# Patient Record
Sex: MALE | Race: WHITE | Employment: FULL TIME | URBAN - METROPOLITAN AREA
[De-identification: names, ages, dates, MRNs, and addresses within clinical notes are randomized per-mention and may not be internally consistent; named-entity substitution may affect disease eponyms.]

---

## 2018-03-15 ENCOUNTER — HOSPITAL ENCOUNTER (OUTPATIENT)
Dept: INTERVENTIONAL RADIOLOGY/VASCULAR | Age: 53
Discharge: HOME | End: 2018-03-17
Payer: COMMERCIAL

## 2018-03-15 DIAGNOSIS — I73.9 PVD (PERIPHERAL VASCULAR DISEASE) (HCC): ICD-10-CM

## 2018-03-15 PROCEDURE — 93923 UPR/LXTR ART STDY 3+ LVLS: CPT

## 2018-04-25 ENCOUNTER — HOSPITAL ENCOUNTER (OUTPATIENT)
Age: 53
Discharge: HOME | End: 2018-04-27
Payer: COMMERCIAL

## 2018-04-25 ENCOUNTER — HOSPITAL ENCOUNTER (OUTPATIENT)
Dept: GENERAL RADIOLOGY | Age: 53
Discharge: HOME | End: 2018-04-27
Payer: COMMERCIAL

## 2018-04-25 ENCOUNTER — OFFICE VISIT (OUTPATIENT)
Dept: VASCULAR SURGERY | Age: 53
End: 2018-04-25
Payer: COMMERCIAL

## 2018-04-25 VITALS — HEART RATE: 72 BPM | DIASTOLIC BLOOD PRESSURE: 70 MMHG | SYSTOLIC BLOOD PRESSURE: 122 MMHG

## 2018-04-25 DIAGNOSIS — M79.672 FOOT PAIN, BILATERAL: Chronic | ICD-10-CM

## 2018-04-25 DIAGNOSIS — M79.671 FOOT PAIN, BILATERAL: Chronic | ICD-10-CM

## 2018-04-25 DIAGNOSIS — R52 PAIN: ICD-10-CM

## 2018-04-25 PROCEDURE — 1036F TOBACCO NON-USER: CPT | Performed by: SURGERY

## 2018-04-25 PROCEDURE — 3017F COLORECTAL CA SCREEN DOC REV: CPT | Performed by: SURGERY

## 2018-04-25 PROCEDURE — 73630 X-RAY EXAM OF FOOT: CPT

## 2018-04-25 PROCEDURE — G8420 CALC BMI NORM PARAMETERS: HCPCS | Performed by: SURGERY

## 2018-04-25 PROCEDURE — G8427 DOCREV CUR MEDS BY ELIG CLIN: HCPCS | Performed by: SURGERY

## 2018-04-25 PROCEDURE — 99203 OFFICE O/P NEW LOW 30 MIN: CPT | Performed by: SURGERY

## 2018-04-25 RX ORDER — NAPROXEN 500 MG/1
500 TABLET ORAL 2 TIMES DAILY
Refills: 1 | COMMUNITY
Start: 2018-04-19 | End: 2019-04-07

## 2018-05-17 ENCOUNTER — HOSPITAL ENCOUNTER (OUTPATIENT)
Dept: CARDIOLOGY | Age: 53
Discharge: HOME | End: 2018-05-17
Payer: COMMERCIAL

## 2018-05-17 LAB
LV EF: 60 %
LVEF MODALITY: NORMAL

## 2018-05-17 PROCEDURE — 93306 TTE W/DOPPLER COMPLETE: CPT

## 2018-07-02 ENCOUNTER — HOSPITAL ENCOUNTER (OUTPATIENT)
Age: 53
Discharge: HOME | End: 2018-07-04

## 2018-07-02 PROCEDURE — 88305 TISSUE EXAM BY PATHOLOGIST: CPT

## 2018-12-21 ENCOUNTER — HOSPITAL ENCOUNTER (OUTPATIENT)
Dept: ULTRASOUND IMAGING | Age: 53
Discharge: HOME | End: 2018-12-23
Payer: COMMERCIAL

## 2018-12-21 DIAGNOSIS — R10.12 ABDOMINAL PAIN, LEFT UPPER QUADRANT: ICD-10-CM

## 2018-12-21 PROCEDURE — 76700 US EXAM ABDOM COMPLETE: CPT

## 2019-01-24 ENCOUNTER — HOSPITAL ENCOUNTER (OUTPATIENT)
Age: 54
Discharge: HOME | End: 2019-01-26

## 2019-01-24 PROCEDURE — 88342 IMHCHEM/IMCYTCHM 1ST ANTB: CPT

## 2019-01-24 PROCEDURE — 88305 TISSUE EXAM BY PATHOLOGIST: CPT

## 2019-02-12 ENCOUNTER — HOSPITAL ENCOUNTER (OUTPATIENT)
Dept: CT IMAGING | Age: 54
Discharge: HOME | End: 2019-02-14
Payer: COMMERCIAL

## 2019-02-12 DIAGNOSIS — R07.9 CHEST PAIN, UNSPECIFIED TYPE: ICD-10-CM

## 2019-02-12 DIAGNOSIS — S21.342S: ICD-10-CM

## 2019-02-12 DIAGNOSIS — S27.809A RUPTURE OF DIAPHRAGM: ICD-10-CM

## 2019-02-12 PROCEDURE — 71250 CT THORAX DX C-: CPT

## 2019-04-07 ENCOUNTER — HOSPITAL ENCOUNTER (EMERGENCY)
Age: 54
Discharge: HOME | End: 2019-04-07
Attending: EMERGENCY MEDICINE
Payer: COMMERCIAL

## 2019-04-07 VITALS
HEART RATE: 65 BPM | OXYGEN SATURATION: 98 % | TEMPERATURE: 98 F | HEIGHT: 70 IN | WEIGHT: 165 LBS | DIASTOLIC BLOOD PRESSURE: 82 MMHG | BODY MASS INDEX: 23.62 KG/M2 | RESPIRATION RATE: 16 BRPM | SYSTOLIC BLOOD PRESSURE: 130 MMHG

## 2019-04-07 DIAGNOSIS — L02.91 ABSCESS: Primary | ICD-10-CM

## 2019-04-07 PROCEDURE — 99282 EMERGENCY DEPT VISIT SF MDM: CPT

## 2019-04-07 PROCEDURE — 10061 I&D ABSCESS COMP/MULTIPLE: CPT

## 2019-04-07 ASSESSMENT — PAIN DESCRIPTION - DESCRIPTORS: DESCRIPTORS: DISCOMFORT

## 2019-04-07 ASSESSMENT — PAIN DESCRIPTION - PAIN TYPE: TYPE: ACUTE PAIN

## 2019-04-07 ASSESSMENT — PAIN DESCRIPTION - LOCATION: LOCATION: NECK

## 2019-04-07 ASSESSMENT — PAIN SCALES - GENERAL: PAINLEVEL_OUTOF10: 2
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
## 2019-04-09 ENCOUNTER — HOSPITAL ENCOUNTER (EMERGENCY)
Age: 54
Discharge: HOME | End: 2019-04-09
Attending: EMERGENCY MEDICINE
Payer: COMMERCIAL

## 2019-04-09 VITALS
OXYGEN SATURATION: 95 % | SYSTOLIC BLOOD PRESSURE: 130 MMHG | HEIGHT: 70 IN | WEIGHT: 168 LBS | TEMPERATURE: 97.6 F | DIASTOLIC BLOOD PRESSURE: 86 MMHG | HEART RATE: 55 BPM | RESPIRATION RATE: 16 BRPM | BODY MASS INDEX: 24.05 KG/M2

## 2019-04-09 DIAGNOSIS — L02.91 ABSCESS: Primary | ICD-10-CM

## 2019-04-09 PROCEDURE — 6370000000 HC RX 637 (ALT 250 FOR IP): Performed by: EMERGENCY MEDICINE

## 2019-04-09 PROCEDURE — 99282 EMERGENCY DEPT VISIT SF MDM: CPT

## 2019-04-09 RX ORDER — CEPHALEXIN 500 MG/1
500 CAPSULE ORAL 4 TIMES DAILY
Qty: 40 CAPSULE | Refills: 0 | Status: SHIPPED | OUTPATIENT
Start: 2019-04-09 | End: 2019-04-19

## 2019-04-09 RX ORDER — CEPHALEXIN 500 MG/1
500 CAPSULE ORAL ONCE
Status: COMPLETED | OUTPATIENT
Start: 2019-04-09 | End: 2019-04-09

## 2019-04-09 RX ORDER — SULFAMETHOXAZOLE AND TRIMETHOPRIM 800; 160 MG/1; MG/1
2 TABLET ORAL 2 TIMES DAILY
Qty: 40 TABLET | Refills: 0 | Status: SHIPPED | OUTPATIENT
Start: 2019-04-09 | End: 2019-04-19

## 2019-04-09 RX ORDER — SULFAMETHOXAZOLE AND TRIMETHOPRIM 800; 160 MG/1; MG/1
2 TABLET ORAL ONCE
Status: COMPLETED | OUTPATIENT
Start: 2019-04-09 | End: 2019-04-09

## 2019-04-09 RX ADMIN — CEPHALEXIN 500 MG: 500 CAPSULE ORAL at 17:58

## 2019-04-09 RX ADMIN — SULFAMETHOXAZOLE AND TRIMETHOPRIM 2 TABLET: 800; 160 TABLET ORAL at 17:56

## 2019-04-09 ASSESSMENT — PAIN DESCRIPTION - ORIENTATION: ORIENTATION: RIGHT

## 2019-04-09 ASSESSMENT — PAIN DESCRIPTION - LOCATION: LOCATION: NECK

## 2019-04-09 ASSESSMENT — PAIN DESCRIPTION - DESCRIPTORS: DESCRIPTORS: SORE

## 2019-04-09 ASSESSMENT — PAIN DESCRIPTION - FREQUENCY: FREQUENCY: CONTINUOUS

## 2019-04-09 ASSESSMENT — PAIN DESCRIPTION - PAIN TYPE: TYPE: ACUTE PAIN

## 2019-04-09 ASSESSMENT — PAIN SCALES - GENERAL: PAINLEVEL_OUTOF10: 10
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
## 2019-11-25 ENCOUNTER — TELEPHONE (OUTPATIENT)
Dept: CARDIOLOGY | Age: 54
End: 2019-11-25

## 2019-11-27 ENCOUNTER — HOSPITAL ENCOUNTER (OUTPATIENT)
Dept: CARDIOLOGY | Age: 54
Discharge: HOME | End: 2019-11-27
Payer: COMMERCIAL

## 2019-11-27 VITALS
WEIGHT: 168 LBS | DIASTOLIC BLOOD PRESSURE: 70 MMHG | HEART RATE: 82 BPM | BODY MASS INDEX: 24.88 KG/M2 | SYSTOLIC BLOOD PRESSURE: 130 MMHG | HEIGHT: 69 IN

## 2019-11-27 DIAGNOSIS — R07.9 CHEST PAIN, UNSPECIFIED TYPE: Primary | ICD-10-CM

## 2019-11-27 LAB
LV EF: 50 %
LVEF MODALITY: NORMAL

## 2019-11-27 PROCEDURE — 2580000003 HC RX 258: Performed by: INTERNAL MEDICINE

## 2019-11-27 PROCEDURE — 78452 HT MUSCLE IMAGE SPECT MULT: CPT

## 2019-11-27 PROCEDURE — A9500 TC99M SESTAMIBI: HCPCS | Performed by: INTERNAL MEDICINE

## 2019-11-27 PROCEDURE — 3430000000 HC RX DIAGNOSTIC RADIOPHARMACEUTICAL: Performed by: INTERNAL MEDICINE

## 2019-11-27 PROCEDURE — 93017 CV STRESS TEST TRACING ONLY: CPT

## 2019-11-27 RX ORDER — SODIUM CHLORIDE 0.9 % (FLUSH) 0.9 %
10 SYRINGE (ML) INJECTION PRN
Status: DISCONTINUED | OUTPATIENT
Start: 2019-11-27 | End: 2019-11-28 | Stop reason: HOSPADM

## 2019-11-27 RX ORDER — CYCLOBENZAPRINE HCL 10 MG
TABLET ORAL PRN
Refills: 5 | COMMUNITY
Start: 2019-11-19 | End: 2020-02-27 | Stop reason: CLARIF

## 2019-11-27 RX ADMIN — Medication 10 ML: at 09:30

## 2019-11-27 RX ADMIN — Medication 10 ML: at 07:31

## 2019-11-27 RX ADMIN — Medication 10.2 MILLICURIE: at 07:30

## 2019-11-27 RX ADMIN — Medication 32.9 MILLICURIE: at 09:30

## 2019-12-06 ENCOUNTER — OFFICE VISIT (OUTPATIENT)
Dept: CARDIOLOGY CLINIC | Age: 54
End: 2019-12-06
Payer: COMMERCIAL

## 2019-12-06 VITALS
WEIGHT: 168 LBS | BODY MASS INDEX: 24.05 KG/M2 | HEART RATE: 58 BPM | RESPIRATION RATE: 16 BRPM | SYSTOLIC BLOOD PRESSURE: 124 MMHG | HEIGHT: 70 IN | DIASTOLIC BLOOD PRESSURE: 82 MMHG

## 2019-12-06 DIAGNOSIS — R07.2 PRECORDIAL PAIN: ICD-10-CM

## 2019-12-06 PROCEDURE — 99244 OFF/OP CNSLTJ NEW/EST MOD 40: CPT | Performed by: INTERNAL MEDICINE

## 2019-12-06 PROCEDURE — 93000 ELECTROCARDIOGRAM COMPLETE: CPT | Performed by: INTERNAL MEDICINE

## 2019-12-06 PROCEDURE — G8427 DOCREV CUR MEDS BY ELIG CLIN: HCPCS | Performed by: INTERNAL MEDICINE

## 2019-12-06 PROCEDURE — G8484 FLU IMMUNIZE NO ADMIN: HCPCS | Performed by: INTERNAL MEDICINE

## 2019-12-06 PROCEDURE — G8420 CALC BMI NORM PARAMETERS: HCPCS | Performed by: INTERNAL MEDICINE

## 2020-02-27 ENCOUNTER — OFFICE VISIT (OUTPATIENT)
Dept: CARDIOLOGY CLINIC | Age: 55
End: 2020-02-27
Payer: COMMERCIAL

## 2020-02-27 VITALS
BODY MASS INDEX: 24.34 KG/M2 | RESPIRATION RATE: 16 BRPM | WEIGHT: 170 LBS | SYSTOLIC BLOOD PRESSURE: 132 MMHG | DIASTOLIC BLOOD PRESSURE: 84 MMHG | HEIGHT: 70 IN | HEART RATE: 50 BPM

## 2020-02-27 PROCEDURE — 93000 ELECTROCARDIOGRAM COMPLETE: CPT | Performed by: INTERNAL MEDICINE

## 2020-02-27 PROCEDURE — G8420 CALC BMI NORM PARAMETERS: HCPCS | Performed by: INTERNAL MEDICINE

## 2020-02-27 PROCEDURE — G8484 FLU IMMUNIZE NO ADMIN: HCPCS | Performed by: INTERNAL MEDICINE

## 2020-02-27 PROCEDURE — 99215 OFFICE O/P EST HI 40 MIN: CPT | Performed by: INTERNAL MEDICINE

## 2020-02-27 PROCEDURE — 3017F COLORECTAL CA SCREEN DOC REV: CPT | Performed by: INTERNAL MEDICINE

## 2020-02-27 PROCEDURE — 1036F TOBACCO NON-USER: CPT | Performed by: INTERNAL MEDICINE

## 2020-02-27 PROCEDURE — G8427 DOCREV CUR MEDS BY ELIG CLIN: HCPCS | Performed by: INTERNAL MEDICINE

## 2020-02-27 RX ORDER — IBUPROFEN 800 MG/1
800 TABLET ORAL EVERY 6 HOURS PRN
COMMUNITY
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
## 2020-03-04 RX ORDER — NITROGLYCERIN 0.4 MG/1
0.4 TABLET SUBLINGUAL ONCE
Status: CANCELLED | OUTPATIENT
Start: 2020-03-10

## 2020-03-04 RX ORDER — SODIUM CHLORIDE 0.9 % (FLUSH) 0.9 %
10 SYRINGE (ML) INJECTION PRN
Status: CANCELLED | OUTPATIENT
Start: 2020-03-04

## 2020-03-04 RX ORDER — SODIUM CHLORIDE 9 MG/ML
INJECTION, SOLUTION INTRAVENOUS ONCE
Status: CANCELLED | OUTPATIENT
Start: 2020-03-10

## 2020-05-29 ENCOUNTER — TELEPHONE (OUTPATIENT)
Dept: CARDIOLOGY CLINIC | Age: 55
End: 2020-05-29

## 2020-07-27 ENCOUNTER — HOSPITAL ENCOUNTER (OUTPATIENT)
Age: 55
Discharge: HOME | End: 2020-07-29
Payer: COMMERCIAL

## 2020-07-27 ENCOUNTER — HOSPITAL ENCOUNTER (OUTPATIENT)
Dept: GENERAL RADIOLOGY | Age: 55
Discharge: HOME | End: 2020-07-29
Payer: COMMERCIAL

## 2020-07-27 PROCEDURE — 74018 RADEX ABDOMEN 1 VIEW: CPT

## 2020-08-31 ENCOUNTER — TELEPHONE (OUTPATIENT)
Dept: CT IMAGING | Age: 55
End: 2020-08-31

## 2020-08-31 NOTE — TELEPHONE ENCOUNTER
8/31/20 patient and office notified that due to missed appts on 4/13/2020 was r/s due to cv 19 , did not keep on 6/1/20, 6/29/20 and 8/31/2020. Patient will not be r/s at this time. L/m on patient line informing him to call cardiology office for follow up.   Office notified

## 2022-09-29 ENCOUNTER — HOSPITAL ENCOUNTER (EMERGENCY)
Age: 57
Discharge: HOME | End: 2022-09-29
Attending: EMERGENCY MEDICINE
Payer: COMMERCIAL

## 2022-09-29 ENCOUNTER — APPOINTMENT (OUTPATIENT)
Dept: CT IMAGING | Age: 57
End: 2022-09-29
Payer: COMMERCIAL

## 2022-09-29 VITALS
WEIGHT: 165 LBS | HEART RATE: 57 BPM | DIASTOLIC BLOOD PRESSURE: 85 MMHG | RESPIRATION RATE: 14 BRPM | HEIGHT: 70 IN | BODY MASS INDEX: 23.62 KG/M2 | TEMPERATURE: 98 F | SYSTOLIC BLOOD PRESSURE: 138 MMHG | OXYGEN SATURATION: 99 %

## 2022-09-29 DIAGNOSIS — G89.29 CHRONIC NONINTRACTABLE HEADACHE, UNSPECIFIED HEADACHE TYPE: Primary | ICD-10-CM

## 2022-09-29 DIAGNOSIS — R51.9 CHRONIC NONINTRACTABLE HEADACHE, UNSPECIFIED HEADACHE TYPE: Primary | ICD-10-CM

## 2022-09-29 LAB
ANION GAP SERPL CALCULATED.3IONS-SCNC: 9 MMOL/L (ref 7–16)
BASOPHILS ABSOLUTE: 0.05 E9/L (ref 0–0.2)
BASOPHILS RELATIVE PERCENT: 0.8 % (ref 0–2)
BUN BLDV-MCNC: 14 MG/DL (ref 6–20)
CALCIUM SERPL-MCNC: 9 MG/DL (ref 8.6–10.2)
CHLORIDE BLD-SCNC: 107 MMOL/L (ref 98–107)
CO2: 23 MMOL/L (ref 22–29)
CREAT SERPL-MCNC: 0.7 MG/DL (ref 0.7–1.2)
EOSINOPHILS ABSOLUTE: 0.17 E9/L (ref 0.05–0.5)
EOSINOPHILS RELATIVE PERCENT: 2.8 % (ref 0–6)
GFR AFRICAN AMERICAN: >60
GFR NON-AFRICAN AMERICAN: >60 ML/MIN/1.73
GLUCOSE BLD-MCNC: 129 MG/DL (ref 74–99)
HCT VFR BLD CALC: 39 % (ref 37–54)
HEMOGLOBIN: 13.3 G/DL (ref 12.5–16.5)
IMMATURE GRANULOCYTES #: 0.01 E9/L
IMMATURE GRANULOCYTES %: 0.2 % (ref 0–5)
LYMPHOCYTES ABSOLUTE: 1.99 E9/L (ref 1.5–4)
LYMPHOCYTES RELATIVE PERCENT: 32.5 % (ref 20–42)
MCH RBC QN AUTO: 30.1 PG (ref 26–35)
MCHC RBC AUTO-ENTMCNC: 34.1 % (ref 32–34.5)
MCV RBC AUTO: 88.2 FL (ref 80–99.9)
MONOCYTES ABSOLUTE: 0.44 E9/L (ref 0.1–0.95)
MONOCYTES RELATIVE PERCENT: 7.2 % (ref 2–12)
NEUTROPHILS ABSOLUTE: 3.46 E9/L (ref 1.8–7.3)
NEUTROPHILS RELATIVE PERCENT: 56.5 % (ref 43–80)
PDW BLD-RTO: 13.2 FL (ref 11.5–15)
PLATELET # BLD: 227 E9/L (ref 130–450)
PMV BLD AUTO: 10.3 FL (ref 7–12)
POTASSIUM SERPL-SCNC: 4 MMOL/L (ref 3.5–5)
RBC # BLD: 4.42 E12/L (ref 3.8–5.8)
SODIUM BLD-SCNC: 139 MMOL/L (ref 132–146)
WBC # BLD: 6.1 E9/L (ref 4.5–11.5)

## 2022-09-29 PROCEDURE — 70450 CT HEAD/BRAIN W/O DYE: CPT

## 2022-09-29 PROCEDURE — 6360000002 HC RX W HCPCS: Performed by: EMERGENCY MEDICINE

## 2022-09-29 PROCEDURE — 99284 EMERGENCY DEPT VISIT MOD MDM: CPT

## 2022-09-29 PROCEDURE — 96374 THER/PROPH/DIAG INJ IV PUSH: CPT

## 2022-09-29 PROCEDURE — 80048 BASIC METABOLIC PNL TOTAL CA: CPT

## 2022-09-29 PROCEDURE — 6370000000 HC RX 637 (ALT 250 FOR IP): Performed by: EMERGENCY MEDICINE

## 2022-09-29 PROCEDURE — 85025 COMPLETE CBC W/AUTO DIFF WBC: CPT

## 2022-09-29 PROCEDURE — 2580000003 HC RX 258: Performed by: EMERGENCY MEDICINE

## 2022-09-29 PROCEDURE — 96375 TX/PRO/DX INJ NEW DRUG ADDON: CPT

## 2022-09-29 RX ORDER — METOCLOPRAMIDE HYDROCHLORIDE 5 MG/ML
10 INJECTION INTRAMUSCULAR; INTRAVENOUS ONCE
Status: COMPLETED | OUTPATIENT
Start: 2022-09-29 | End: 2022-09-29

## 2022-09-29 RX ORDER — 0.9 % SODIUM CHLORIDE 0.9 %
1000 INTRAVENOUS SOLUTION INTRAVENOUS ONCE
Status: COMPLETED | OUTPATIENT
Start: 2022-09-29 | End: 2022-09-29

## 2022-09-29 RX ORDER — DIPHENHYDRAMINE HYDROCHLORIDE 50 MG/ML
25 INJECTION INTRAMUSCULAR; INTRAVENOUS ONCE
Status: COMPLETED | OUTPATIENT
Start: 2022-09-29 | End: 2022-09-29

## 2022-09-29 RX ORDER — ACETAMINOPHEN 500 MG
1000 TABLET ORAL ONCE
Status: COMPLETED | OUTPATIENT
Start: 2022-09-29 | End: 2022-09-29

## 2022-09-29 RX ADMIN — ACETAMINOPHEN 1000 MG: 500 TABLET ORAL at 12:06

## 2022-09-29 RX ADMIN — METOCLOPRAMIDE 10 MG: 5 INJECTION, SOLUTION INTRAMUSCULAR; INTRAVENOUS at 12:08

## 2022-09-29 RX ADMIN — DIPHENHYDRAMINE HYDROCHLORIDE 25 MG: 50 INJECTION, SOLUTION INTRAMUSCULAR; INTRAVENOUS at 12:08

## 2022-09-29 RX ADMIN — SODIUM CHLORIDE 1000 ML: 9 INJECTION, SOLUTION INTRAVENOUS at 12:07

## 2022-09-29 ASSESSMENT — PAIN SCALES - GENERAL: PAINLEVEL_OUTOF10: 3

## 2022-09-29 NOTE — ED PROVIDER NOTES
biopsy. Social History:  reports that he has quit smoking. His smoking use included cigarettes. He has a 38.00 pack-year smoking history. He quit smokeless tobacco use about 4 years ago. He reports current alcohol use. He reports current drug use. Drug: Marijuana Aloma Bear). Family History: family history includes Diabetes in his mother; Obesity in his brother, brother, and sister; Other in his brother and brother. The patients home medications have been reviewed. Allergies: Other    ---------------------------------------------------PHYSICAL EXAM--------------------------------------    Constitutional/General: Alert and oriented x3, well appearing, non toxic in NAD; patient smiling pleasant   Head: Normocephalic and atraumatic  Eyes: PERRL, EOMI, conjunctive normal, sclera non icteric  Mouth: Oropharynx clear, handling secretions, no trismus, no asymmetry of the posterior oropharynx or uvular edema  Neck: Supple, full ROM, non tender to palpation in the midline, no stridor, no crepitus, no meningeal signs  Respiratory: Lungs clear to auscultation bilaterally, no wheezes, rales, or rhonchi. Not in respiratory distress  Cardiovascular:  Regular rate. Regular rhythm. No murmurs, gallops, or rubs. 2+ distal pulses  Chest: No chest wall tenderness  GI:  Abdomen Soft, Non tender, Non distended. +BS. No organomegaly, no palpable masses,  No rebound, guarding, or rigidity. Musculoskeletal: Moves all extremities x 4. Warm and well perfused, no clubbing, cyanosis, or edema. Capillary refill <3 seconds  Integument: skin warm and dry. No rashes. Lymphatic: no lymphadenopathy noted  Neurologic: GCS 15, no focal deficits, symmetric strength 5/5 in the upper and lower extremities bilaterally; no aphasia. No dysarthria. No facial droop.   Psychiatric: Normal Affect    -------------------------------------------------- RESULTS -------------------------------------------------  I have personally reviewed all laboratory and imaging results for this patient. Results are listed below. LABS:  Results for orders placed or performed during the hospital encounter of 09/29/22   CBC with Auto Differential   Result Value Ref Range    WBC 6.1 4.5 - 11.5 E9/L    RBC 4.42 3.80 - 5.80 E12/L    Hemoglobin 13.3 12.5 - 16.5 g/dL    Hematocrit 39.0 37.0 - 54.0 %    MCV 88.2 80.0 - 99.9 fL    MCH 30.1 26.0 - 35.0 pg    MCHC 34.1 32.0 - 34.5 %    RDW 13.2 11.5 - 15.0 fL    Platelets 886 379 - 884 E9/L    MPV 10.3 7.0 - 12.0 fL    Neutrophils % 56.5 43.0 - 80.0 %    Immature Granulocytes % 0.2 0.0 - 5.0 %    Lymphocytes % 32.5 20.0 - 42.0 %    Monocytes % 7.2 2.0 - 12.0 %    Eosinophils % 2.8 0.0 - 6.0 %    Basophils % 0.8 0.0 - 2.0 %    Neutrophils Absolute 3.46 1.80 - 7.30 E9/L    Immature Granulocytes # 0.01 E9/L    Lymphocytes Absolute 1.99 1.50 - 4.00 E9/L    Monocytes Absolute 0.44 0.10 - 0.95 E9/L    Eosinophils Absolute 0.17 0.05 - 0.50 E9/L    Basophils Absolute 0.05 0.00 - 0.20 U6/C   Basic Metabolic Panel   Result Value Ref Range    Sodium 139 132 - 146 mmol/L    Potassium 4.0 3.5 - 5.0 mmol/L    Chloride 107 98 - 107 mmol/L    CO2 23 22 - 29 mmol/L    Anion Gap 9 7 - 16 mmol/L    Glucose 129 (H) 74 - 99 mg/dL    BUN 14 6 - 20 mg/dL    Creatinine 0.7 0.7 - 1.2 mg/dL    GFR Non-African American >60 >=60 mL/min/1.73    GFR African American >60     Calcium 9.0 8.6 - 10.2 mg/dL       RADIOLOGY:  Interpreted by Radiologist.  CT HEAD WO CONTRAST   Final Result   No acute intracranial abnormality. Specifically, there is no acute   intracranial hemorrhage                   ------------------------- NURSING NOTES AND VITALS REVIEWED ---------------------------   The nursing notes within the ED encounter and vital signs as below have been reviewed by myself.   /85   Pulse 57   Temp 98 °F (36.7 °C) (Temporal)   Resp 14   Ht 5' 10\" (1.778 m)   Wt 165 lb (74.8 kg)   SpO2 99%   BMI 23.68 kg/m²   Oxygen Saturation discharge. This patient has remained hemodynamically stable during their ED course. Re-Evaluations:             Re-evaluation. Patients symptoms are improving        Counseling: The emergency provider has spoken with the patient and discussed todays results, in addition to providing specific details for the plan of care and counseling regarding the diagnosis and prognosis. Questions are answered at this time and they are agreeable with the plan.       --------------------------------- IMPRESSION AND DISPOSITION ---------------------------------    IMPRESSION  1. Chronic nonintractable headache, unspecified headache type Improving       DISPOSITION  Disposition: Discharge to home  Patient condition is stable    NOTE: This report was transcribed using voice recognition software.  Every effort was made to ensure accuracy; however, inadvertent computerized transcription errors may be present        Michael Bass MD  09/29/22 0054

## 2022-10-04 ENCOUNTER — OFFICE VISIT (OUTPATIENT)
Dept: NEUROLOGY | Age: 57
End: 2022-10-04
Payer: COMMERCIAL

## 2022-10-04 VITALS
DIASTOLIC BLOOD PRESSURE: 83 MMHG | BODY MASS INDEX: 23.68 KG/M2 | OXYGEN SATURATION: 97 % | SYSTOLIC BLOOD PRESSURE: 135 MMHG | HEART RATE: 73 BPM | TEMPERATURE: 97.8 F | WEIGHT: 165 LBS

## 2022-10-04 DIAGNOSIS — M54.81 CERVICO-OCCIPITAL NEURALGIA OF LEFT SIDE: ICD-10-CM

## 2022-10-04 DIAGNOSIS — R51.9 NEW ONSET OF HEADACHES AFTER AGE 50: Primary | ICD-10-CM

## 2022-10-04 DIAGNOSIS — R51.9 OCCIPITAL HEADACHE: ICD-10-CM

## 2022-10-04 DIAGNOSIS — R51.9 NEW ONSET OF HEADACHES AFTER AGE 50: ICD-10-CM

## 2022-10-04 PROBLEM — R07.2 PRECORDIAL PAIN: Status: RESOLVED | Noted: 2019-12-06 | Resolved: 2022-10-04

## 2022-10-04 LAB
BUN BLDV-MCNC: 13 MG/DL (ref 6–20)
C-REACTIVE PROTEIN: 0.3 MG/DL (ref 0–0.4)
CREAT SERPL-MCNC: 0.9 MG/DL (ref 0.7–1.2)
GFR AFRICAN AMERICAN: >60
GFR NON-AFRICAN AMERICAN: >60 ML/MIN/1.73
TSH SERPL DL<=0.05 MIU/L-ACNC: 0.9 UIU/ML (ref 0.27–4.2)

## 2022-10-04 PROCEDURE — G8420 CALC BMI NORM PARAMETERS: HCPCS | Performed by: NURSE PRACTITIONER

## 2022-10-04 PROCEDURE — 99204 OFFICE O/P NEW MOD 45 MIN: CPT | Performed by: NURSE PRACTITIONER

## 2022-10-04 PROCEDURE — 3017F COLORECTAL CA SCREEN DOC REV: CPT | Performed by: NURSE PRACTITIONER

## 2022-10-04 PROCEDURE — G8427 DOCREV CUR MEDS BY ELIG CLIN: HCPCS | Performed by: NURSE PRACTITIONER

## 2022-10-04 PROCEDURE — 4004F PT TOBACCO SCREEN RCVD TLK: CPT | Performed by: NURSE PRACTITIONER

## 2022-10-04 PROCEDURE — G8484 FLU IMMUNIZE NO ADMIN: HCPCS | Performed by: NURSE PRACTITIONER

## 2022-10-04 RX ORDER — ESOMEPRAZOLE MAGNESIUM 40 MG/1
CAPSULE, DELAYED RELEASE ORAL
COMMUNITY
Start: 2022-09-30

## 2022-10-04 RX ORDER — AMITRIPTYLINE HYDROCHLORIDE 10 MG/1
10 TABLET, FILM COATED ORAL NIGHTLY
Qty: 90 TABLET | Refills: 3 | Status: SHIPPED | OUTPATIENT
Start: 2022-10-04

## 2022-10-04 RX ORDER — LORAZEPAM 0.5 MG/1
0.5 TABLET ORAL ONCE
Qty: 1 TABLET | Refills: 0 | Status: SHIPPED | OUTPATIENT
Start: 2022-10-04 | End: 2022-10-04
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
## 2022-10-05 LAB — SEDIMENTATION RATE, ERYTHROCYTE: 2 MM/HR (ref 0–15)

## 2022-12-06 ENCOUNTER — HOSPITAL ENCOUNTER (EMERGENCY)
Age: 57
Discharge: HOME | DRG: 603 | End: 2022-12-06
Attending: STUDENT IN AN ORGANIZED HEALTH CARE EDUCATION/TRAINING PROGRAM
Payer: COMMERCIAL

## 2022-12-06 ENCOUNTER — APPOINTMENT (OUTPATIENT)
Dept: GENERAL RADIOLOGY | Age: 57
DRG: 603 | End: 2022-12-06
Payer: COMMERCIAL

## 2022-12-06 VITALS
HEART RATE: 70 BPM | WEIGHT: 165 LBS | HEIGHT: 70 IN | DIASTOLIC BLOOD PRESSURE: 72 MMHG | OXYGEN SATURATION: 99 % | SYSTOLIC BLOOD PRESSURE: 134 MMHG | TEMPERATURE: 97.8 F | BODY MASS INDEX: 23.62 KG/M2 | RESPIRATION RATE: 14 BRPM

## 2022-12-06 DIAGNOSIS — M79.642 LEFT HAND PAIN: ICD-10-CM

## 2022-12-06 DIAGNOSIS — L03.90 CELLULITIS, UNSPECIFIED CELLULITIS SITE: Primary | ICD-10-CM

## 2022-12-06 PROCEDURE — 6370000000 HC RX 637 (ALT 250 FOR IP): Performed by: STUDENT IN AN ORGANIZED HEALTH CARE EDUCATION/TRAINING PROGRAM

## 2022-12-06 PROCEDURE — 90471 IMMUNIZATION ADMIN: CPT | Performed by: STUDENT IN AN ORGANIZED HEALTH CARE EDUCATION/TRAINING PROGRAM

## 2022-12-06 PROCEDURE — 96372 THER/PROPH/DIAG INJ SC/IM: CPT

## 2022-12-06 PROCEDURE — 90714 TD VACC NO PRESV 7 YRS+ IM: CPT | Performed by: STUDENT IN AN ORGANIZED HEALTH CARE EDUCATION/TRAINING PROGRAM

## 2022-12-06 PROCEDURE — 6360000002 HC RX W HCPCS: Performed by: STUDENT IN AN ORGANIZED HEALTH CARE EDUCATION/TRAINING PROGRAM

## 2022-12-06 PROCEDURE — 99284 EMERGENCY DEPT VISIT MOD MDM: CPT

## 2022-12-06 PROCEDURE — 73130 X-RAY EXAM OF HAND: CPT

## 2022-12-06 RX ORDER — CEPHALEXIN 500 MG/1
500 CAPSULE ORAL 4 TIMES DAILY
Qty: 28 CAPSULE | Refills: 0 | Status: ON HOLD | OUTPATIENT
Start: 2022-12-06 | End: 2022-12-14 | Stop reason: HOSPADM

## 2022-12-06 RX ORDER — TETANUS AND DIPHTHERIA TOXOIDS ADSORBED 2; 2 [LF]/.5ML; [LF]/.5ML
0.5 INJECTION INTRAMUSCULAR ONCE
Status: COMPLETED | OUTPATIENT
Start: 2022-12-06 | End: 2022-12-06

## 2022-12-06 RX ORDER — CEPHALEXIN 500 MG/1
500 CAPSULE ORAL ONCE
Status: COMPLETED | OUTPATIENT
Start: 2022-12-06 | End: 2022-12-06

## 2022-12-06 RX ORDER — SULFAMETHOXAZOLE AND TRIMETHOPRIM 800; 160 MG/1; MG/1
1 TABLET ORAL ONCE
Status: COMPLETED | OUTPATIENT
Start: 2022-12-06 | End: 2022-12-06

## 2022-12-06 RX ORDER — SULFAMETHOXAZOLE AND TRIMETHOPRIM 800; 160 MG/1; MG/1
2 TABLET ORAL 2 TIMES DAILY
Qty: 28 TABLET | Refills: 0 | Status: ON HOLD | OUTPATIENT
Start: 2022-12-06 | End: 2022-12-14 | Stop reason: HOSPADM

## 2022-12-06 RX ADMIN — SULFAMETHOXAZOLE AND TRIMETHOPRIM 1 TABLET: 800; 160 TABLET ORAL at 08:04

## 2022-12-06 RX ADMIN — CEPHALEXIN 500 MG: 500 CAPSULE ORAL at 08:04

## 2022-12-06 RX ADMIN — TETANUS AND DIPHTHERIA TOXOIDS ADSORBED 0.5 ML: 2; 2 INJECTION INTRAMUSCULAR at 08:04

## 2022-12-06 ASSESSMENT — ENCOUNTER SYMPTOMS
COUGH: 0
CHEST TIGHTNESS: 0
VOMITING: 0
BACK PAIN: 0
DIARRHEA: 0
NAUSEA: 0
PHOTOPHOBIA: 0
ABDOMINAL PAIN: 0
SHORTNESS OF BREATH: 0
ABDOMINAL DISTENTION: 0
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
## 2022-12-08 ENCOUNTER — HOSPITAL ENCOUNTER (INPATIENT)
Age: 57
LOS: 6 days | Discharge: HOME | DRG: 603 | End: 2022-12-14
Attending: STUDENT IN AN ORGANIZED HEALTH CARE EDUCATION/TRAINING PROGRAM | Admitting: INTERNAL MEDICINE
Payer: COMMERCIAL

## 2022-12-08 DIAGNOSIS — L03.90 CELLULITIS, UNSPECIFIED CELLULITIS SITE: Primary | ICD-10-CM

## 2022-12-08 LAB
ALBUMIN SERPL-MCNC: 4.7 G/DL (ref 3.5–5.2)
ALP BLD-CCNC: 115 U/L (ref 40–129)
ALT SERPL-CCNC: 27 U/L (ref 0–40)
ANION GAP SERPL CALCULATED.3IONS-SCNC: 13 MMOL/L (ref 7–16)
AST SERPL-CCNC: 20 U/L (ref 0–39)
BASOPHILS ABSOLUTE: 0.03 E9/L (ref 0–0.2)
BASOPHILS RELATIVE PERCENT: 0.2 % (ref 0–2)
BILIRUB SERPL-MCNC: 1.3 MG/DL (ref 0–1.2)
BUN BLDV-MCNC: 15 MG/DL (ref 6–20)
C-REACTIVE PROTEIN: 3.1 MG/DL (ref 0–0.4)
CALCIUM SERPL-MCNC: 9.8 MG/DL (ref 8.6–10.2)
CHLORIDE BLD-SCNC: 100 MMOL/L (ref 98–107)
CO2: 20 MMOL/L (ref 22–29)
CREAT SERPL-MCNC: 1.1 MG/DL (ref 0.7–1.2)
EOSINOPHILS ABSOLUTE: 0.02 E9/L (ref 0.05–0.5)
EOSINOPHILS RELATIVE PERCENT: 0.2 % (ref 0–6)
GFR SERPL CREATININE-BSD FRML MDRD: >60 ML/MIN/1.73
GLUCOSE BLD-MCNC: 110 MG/DL (ref 74–99)
HCT VFR BLD CALC: 45.3 % (ref 37–54)
HEMOGLOBIN: 15.6 G/DL (ref 12.5–16.5)
IMMATURE GRANULOCYTES #: 0.05 E9/L
IMMATURE GRANULOCYTES %: 0.4 % (ref 0–5)
LACTIC ACID, SEPSIS: 1.1 MMOL/L (ref 0.5–1.9)
LACTIC ACID: 1.1 MMOL/L (ref 0.5–2.2)
LYMPHOCYTES ABSOLUTE: 1.6 E9/L (ref 1.5–4)
LYMPHOCYTES RELATIVE PERCENT: 12.2 % (ref 20–42)
MCH RBC QN AUTO: 30.6 PG (ref 26–35)
MCHC RBC AUTO-ENTMCNC: 34.4 % (ref 32–34.5)
MCV RBC AUTO: 88.8 FL (ref 80–99.9)
MONOCYTES ABSOLUTE: 0.94 E9/L (ref 0.1–0.95)
MONOCYTES RELATIVE PERCENT: 7.1 % (ref 2–12)
NEUTROPHILS ABSOLUTE: 10.51 E9/L (ref 1.8–7.3)
NEUTROPHILS RELATIVE PERCENT: 79.9 % (ref 43–80)
PDW BLD-RTO: 13 FL (ref 11.5–15)
PLATELET # BLD: 269 E9/L (ref 130–450)
PMV BLD AUTO: 10.6 FL (ref 7–12)
POTASSIUM SERPL-SCNC: 4.4 MMOL/L (ref 3.5–5)
RBC # BLD: 5.1 E12/L (ref 3.8–5.8)
SEDIMENTATION RATE, ERYTHROCYTE: 12 MM/HR (ref 0–15)
SODIUM BLD-SCNC: 133 MMOL/L (ref 132–146)
TOTAL PROTEIN: 8.2 G/DL (ref 6.4–8.3)
WBC # BLD: 13.2 E9/L (ref 4.5–11.5)

## 2022-12-08 PROCEDURE — 1200000000 HC SEMI PRIVATE

## 2022-12-08 PROCEDURE — 6370000000 HC RX 637 (ALT 250 FOR IP): Performed by: NURSE PRACTITIONER

## 2022-12-08 PROCEDURE — 2580000003 HC RX 258: Performed by: NURSE PRACTITIONER

## 2022-12-08 PROCEDURE — 36415 COLL VENOUS BLD VENIPUNCTURE: CPT

## 2022-12-08 PROCEDURE — 6360000002 HC RX W HCPCS: Performed by: NURSE PRACTITIONER

## 2022-12-08 PROCEDURE — 85025 COMPLETE CBC W/AUTO DIFF WBC: CPT

## 2022-12-08 PROCEDURE — 83605 ASSAY OF LACTIC ACID: CPT

## 2022-12-08 PROCEDURE — 80053 COMPREHEN METABOLIC PANEL: CPT

## 2022-12-08 PROCEDURE — 85651 RBC SED RATE NONAUTOMATED: CPT

## 2022-12-08 PROCEDURE — 86140 C-REACTIVE PROTEIN: CPT

## 2022-12-08 PROCEDURE — 6370000000 HC RX 637 (ALT 250 FOR IP): Performed by: STUDENT IN AN ORGANIZED HEALTH CARE EDUCATION/TRAINING PROGRAM

## 2022-12-08 PROCEDURE — 87040 BLOOD CULTURE FOR BACTERIA: CPT

## 2022-12-08 PROCEDURE — 99285 EMERGENCY DEPT VISIT HI MDM: CPT

## 2022-12-08 RX ORDER — ONDANSETRON 2 MG/ML
4 INJECTION INTRAMUSCULAR; INTRAVENOUS EVERY 6 HOURS PRN
Status: DISCONTINUED | OUTPATIENT
Start: 2022-12-08 | End: 2022-12-14 | Stop reason: HOSPADM

## 2022-12-08 RX ORDER — ENOXAPARIN SODIUM 100 MG/ML
40 INJECTION SUBCUTANEOUS DAILY
Status: DISCONTINUED | OUTPATIENT
Start: 2022-12-08 | End: 2022-12-14 | Stop reason: HOSPADM

## 2022-12-08 RX ORDER — SODIUM CHLORIDE 9 MG/ML
INJECTION, SOLUTION INTRAVENOUS PRN
Status: DISCONTINUED | OUTPATIENT
Start: 2022-12-08 | End: 2022-12-14 | Stop reason: HOSPADM

## 2022-12-08 RX ORDER — SODIUM CHLORIDE 0.9 % (FLUSH) 0.9 %
10 SYRINGE (ML) INJECTION PRN
Status: DISCONTINUED | OUTPATIENT
Start: 2022-12-08 | End: 2022-12-14 | Stop reason: HOSPADM

## 2022-12-08 RX ORDER — SODIUM CHLORIDE 9 MG/ML
INJECTION, SOLUTION INTRAVENOUS CONTINUOUS
Status: DISCONTINUED | OUTPATIENT
Start: 2022-12-08 | End: 2022-12-14 | Stop reason: HOSPADM

## 2022-12-08 RX ORDER — ACETAMINOPHEN 650 MG/1
650 SUPPOSITORY RECTAL EVERY 6 HOURS PRN
Status: DISCONTINUED | OUTPATIENT
Start: 2022-12-08 | End: 2022-12-14 | Stop reason: HOSPADM

## 2022-12-08 RX ORDER — LORAZEPAM 1 MG/1
1 TABLET ORAL ONCE
Status: COMPLETED | OUTPATIENT
Start: 2022-12-08 | End: 2022-12-08

## 2022-12-08 RX ORDER — ONDANSETRON 4 MG/1
4 TABLET, ORALLY DISINTEGRATING ORAL EVERY 8 HOURS PRN
Status: DISCONTINUED | OUTPATIENT
Start: 2022-12-08 | End: 2022-12-14 | Stop reason: HOSPADM

## 2022-12-08 RX ORDER — SODIUM CHLORIDE 0.9 % (FLUSH) 0.9 %
10 SYRINGE (ML) INJECTION EVERY 12 HOURS SCHEDULED
Status: DISCONTINUED | OUTPATIENT
Start: 2022-12-08 | End: 2022-12-14 | Stop reason: HOSPADM

## 2022-12-08 RX ORDER — ACETAMINOPHEN 325 MG/1
650 TABLET ORAL EVERY 6 HOURS PRN
Status: DISCONTINUED | OUTPATIENT
Start: 2022-12-08 | End: 2022-12-14 | Stop reason: HOSPADM

## 2022-12-08 RX ADMIN — LORAZEPAM 1 MG: 1 TABLET ORAL at 15:58

## 2022-12-08 RX ADMIN — SODIUM CHLORIDE: 9 INJECTION, SOLUTION INTRAVENOUS at 18:00

## 2022-12-08 RX ADMIN — CEFEPIME 2000 MG: 2 INJECTION, POWDER, FOR SOLUTION INTRAVENOUS at 17:17

## 2022-12-08 RX ADMIN — ONDANSETRON 4 MG: 4 TABLET, ORALLY DISINTEGRATING ORAL at 15:58

## 2022-12-08 RX ADMIN — ACETAMINOPHEN 325 MG: 325 TABLET ORAL at 17:12

## 2022-12-08 ASSESSMENT — PAIN - FUNCTIONAL ASSESSMENT: PAIN_FUNCTIONAL_ASSESSMENT: ACTIVITIES ARE NOT PREVENTED

## 2022-12-08 ASSESSMENT — PAIN DESCRIPTION - DESCRIPTORS: DESCRIPTORS: ACHING;DISCOMFORT;SORE

## 2022-12-08 ASSESSMENT — ENCOUNTER SYMPTOMS
SHORTNESS OF BREATH: 0
COUGH: 0
PHOTOPHOBIA: 0
NAUSEA: 0
CHEST TIGHTNESS: 0
ABDOMINAL DISTENTION: 0
ABDOMINAL PAIN: 0
VOMITING: 0
DIARRHEA: 0
BACK PAIN: 0

## 2022-12-08 ASSESSMENT — PAIN DESCRIPTION - ORIENTATION: ORIENTATION: LEFT

## 2022-12-08 ASSESSMENT — PAIN SCALES - GENERAL: PAINLEVEL_OUTOF10: 2

## 2022-12-08 ASSESSMENT — PAIN DESCRIPTION - LOCATION: LOCATION: HAND

## 2022-12-08 NOTE — PROGRESS NOTES
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 Alayna Paulino  Monitor overnight
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
## 2022-12-08 NOTE — PROGRESS NOTES
Pharmacy Consultation Note  (Antibiotic Dosing and Monitoring)    Initial consult date: 12/8/22  Consulting physician/provider: Dr Alvin Tom  Drug: Vancomycin  Indication: Cellulitis    Age/  Gender Height Weight IBW  Allergy Information   57 y.o./male         Ideal body weight: 73 kg (160 lb 15 oz)  Adjusted ideal body weight: 73.7 kg (162 lb 9 oz)   Patient has no known allergies. Renal Function:  Recent Labs     12/08/22  1053   BUN 15   CREATININE 1.1     No intake or output data in the 24 hours ending 12/08/22 1533    Vancomycin Monitoring:  Trough:  No results for input(s): VANCOTROUGH in the last 72 hours. Random:  No results for input(s): VANCORANDOM in the last 72 hours. No results for input(s): Michelle Sever in the last 72 hours. Historical Cultures:  Organism   Date Value Ref Range Status   10/04/2015 Staphylococcus aureus (A)  Final     No results for input(s): BC in the last 72 hours. Vancomycin Administration Times:  Recent vancomycin administrations        No vancomycin IV orders with administrations found. Orders not given:            vancomycin 1500 mg in dextrose 5% 300 mL IVPB                    Assessment:  Patient is a 65 y.o. male who has been initiated on vancomycin  Estimated Creatinine Clearance: 77 mL/min (based on SCr of 1.1 mg/dL). Plan:   Will continue vancomycin 1250 mg IV every 12 hours  Will check vancomycin levels when appropriate  Will continue to monitor renal function   Pharmacy to follow    Subhash Richter, PharmD, BCPS, BCCCP 12/8/2022 3:35 PM
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
## 2022-12-09 PROBLEM — L03.114 CELLULITIS OF LEFT HAND: Status: ACTIVE | Noted: 2022-12-09

## 2022-12-09 LAB
ANION GAP SERPL CALCULATED.3IONS-SCNC: 15 MMOL/L (ref 7–16)
BASOPHILS ABSOLUTE: 0.04 E9/L (ref 0–0.2)
BASOPHILS RELATIVE PERCENT: 0.4 % (ref 0–2)
BUN BLDV-MCNC: 14 MG/DL (ref 6–20)
CALCIUM SERPL-MCNC: 9.1 MG/DL (ref 8.6–10.2)
CHLORIDE BLD-SCNC: 103 MMOL/L (ref 98–107)
CO2: 17 MMOL/L (ref 22–29)
CREAT SERPL-MCNC: 0.9 MG/DL (ref 0.7–1.2)
EOSINOPHILS ABSOLUTE: 0.08 E9/L (ref 0.05–0.5)
EOSINOPHILS RELATIVE PERCENT: 0.8 % (ref 0–6)
GFR SERPL CREATININE-BSD FRML MDRD: >60 ML/MIN/1.73
GLUCOSE BLD-MCNC: 98 MG/DL (ref 74–99)
HCT VFR BLD CALC: 44.5 % (ref 37–54)
HEMOGLOBIN: 15 G/DL (ref 12.5–16.5)
IMMATURE GRANULOCYTES #: 0.02 E9/L
IMMATURE GRANULOCYTES %: 0.2 % (ref 0–5)
LYMPHOCYTES ABSOLUTE: 1.41 E9/L (ref 1.5–4)
LYMPHOCYTES RELATIVE PERCENT: 13.7 % (ref 20–42)
MCH RBC QN AUTO: 30.4 PG (ref 26–35)
MCHC RBC AUTO-ENTMCNC: 33.7 % (ref 32–34.5)
MCV RBC AUTO: 90.1 FL (ref 80–99.9)
MONOCYTES ABSOLUTE: 0.93 E9/L (ref 0.1–0.95)
MONOCYTES RELATIVE PERCENT: 9 % (ref 2–12)
NEUTROPHILS ABSOLUTE: 7.83 E9/L (ref 1.8–7.3)
NEUTROPHILS RELATIVE PERCENT: 75.9 % (ref 43–80)
PDW BLD-RTO: 13.2 FL (ref 11.5–15)
PLATELET # BLD: 245 E9/L (ref 130–450)
PMV BLD AUTO: 10.5 FL (ref 7–12)
POTASSIUM REFLEX MAGNESIUM: 4.3 MMOL/L (ref 3.5–5)
RBC # BLD: 4.94 E12/L (ref 3.8–5.8)
SODIUM BLD-SCNC: 135 MMOL/L (ref 132–146)
WBC # BLD: 10.3 E9/L (ref 4.5–11.5)

## 2022-12-09 PROCEDURE — 6360000002 HC RX W HCPCS: Performed by: NURSE PRACTITIONER

## 2022-12-09 PROCEDURE — 6360000002 HC RX W HCPCS: Performed by: INTERNAL MEDICINE

## 2022-12-09 PROCEDURE — 80048 BASIC METABOLIC PNL TOTAL CA: CPT

## 2022-12-09 PROCEDURE — 85025 COMPLETE CBC W/AUTO DIFF WBC: CPT

## 2022-12-09 PROCEDURE — 1200000000 HC SEMI PRIVATE

## 2022-12-09 PROCEDURE — 36415 COLL VENOUS BLD VENIPUNCTURE: CPT

## 2022-12-09 PROCEDURE — 2580000003 HC RX 258: Performed by: NURSE PRACTITIONER

## 2022-12-09 PROCEDURE — 2580000003 HC RX 258: Performed by: INTERNAL MEDICINE

## 2022-12-09 PROCEDURE — 6370000000 HC RX 637 (ALT 250 FOR IP): Performed by: FAMILY MEDICINE

## 2022-12-09 PROCEDURE — 6370000000 HC RX 637 (ALT 250 FOR IP): Performed by: NURSE PRACTITIONER

## 2022-12-09 RX ORDER — LORAZEPAM 0.5 MG/1
0.5 TABLET ORAL ONCE
Status: COMPLETED | OUTPATIENT
Start: 2022-12-09 | End: 2022-12-09

## 2022-12-09 RX ORDER — IBUPROFEN 200 MG
200 TABLET ORAL EVERY 6 HOURS PRN
Status: DISCONTINUED | OUTPATIENT
Start: 2022-12-09 | End: 2022-12-14 | Stop reason: HOSPADM

## 2022-12-09 RX ADMIN — VANCOMYCIN HYDROCHLORIDE 1000 MG: 1 INJECTION, POWDER, LYOPHILIZED, FOR SOLUTION INTRAVENOUS at 11:17

## 2022-12-09 RX ADMIN — LORAZEPAM 0.5 MG: 0.5 TABLET ORAL at 08:00

## 2022-12-09 RX ADMIN — CEFEPIME 2000 MG: 2 INJECTION, POWDER, FOR SOLUTION INTRAVENOUS at 04:40

## 2022-12-09 RX ADMIN — IBUPROFEN 200 MG: 200 TABLET, FILM COATED ORAL at 11:13

## 2022-12-09 RX ADMIN — SODIUM CHLORIDE: 9 INJECTION, SOLUTION INTRAVENOUS at 21:47

## 2022-12-09 RX ADMIN — ONDANSETRON 4 MG: 4 TABLET, ORALLY DISINTEGRATING ORAL at 01:02

## 2022-12-09 RX ADMIN — CEFEPIME 2000 MG: 2 INJECTION, POWDER, FOR SOLUTION INTRAVENOUS at 16:33

## 2022-12-09 RX ADMIN — SODIUM CHLORIDE: 9 INJECTION, SOLUTION INTRAVENOUS at 06:39

## 2022-12-09 RX ADMIN — VANCOMYCIN HYDROCHLORIDE 1250 MG: 10 INJECTION, POWDER, LYOPHILIZED, FOR SOLUTION INTRAVENOUS at 00:05

## 2022-12-09 ASSESSMENT — PAIN DESCRIPTION - ONSET: ONSET: ON-GOING

## 2022-12-09 ASSESSMENT — PAIN SCALES - GENERAL
PAINLEVEL_OUTOF10: 4
PAINLEVEL_OUTOF10: 2
PAINLEVEL_OUTOF10: 3
PAINLEVEL_OUTOF10: 5
PAINLEVEL_OUTOF10: 0

## 2022-12-09 ASSESSMENT — PAIN - FUNCTIONAL ASSESSMENT
PAIN_FUNCTIONAL_ASSESSMENT: ACTIVITIES ARE NOT PREVENTED

## 2022-12-09 ASSESSMENT — PAIN DESCRIPTION - LOCATION
LOCATION: HAND
LOCATION: ABDOMEN
LOCATION: ABDOMEN

## 2022-12-09 ASSESSMENT — PAIN DESCRIPTION - ORIENTATION
ORIENTATION: LEFT
ORIENTATION: MID
ORIENTATION: MID

## 2022-12-09 ASSESSMENT — PAIN DESCRIPTION - DESCRIPTORS
DESCRIPTORS: ACHING;SHOOTING;NAGGING
DESCRIPTORS: ACHING;SHOOTING;PRESSURE

## 2022-12-09 ASSESSMENT — PAIN DESCRIPTION - FREQUENCY: FREQUENCY: CONTINUOUS

## 2022-12-09 ASSESSMENT — PAIN DESCRIPTION - PAIN TYPE: TYPE: ACUTE PAIN

## 2022-12-09 NOTE — PROGRESS NOTES
Pharmacy Consultation Note  (Antibiotic Dosing and Monitoring)    Initial consult date: 12/8/22  Consulting physician/provider: Dr. Monie Newell  Drug: Vancomycin  Indication: Cellulitis    Age/  Gender Height Weight IBW  Allergy Information   57 y.o./male 177.8 cm 74.8 kg     Ideal body weight: 73 kg (160 lb 15 oz)  Adjusted ideal body weight: 73.7 kg (162 lb 9 oz)   Patient has no known allergies. Renal Function:  Recent Labs     12/08/22  1053 12/09/22  0705   BUN 15 14   CREATININE 1.1 0.9       Intake/Output Summary (Last 24 hours) at 12/9/2022 1000  Last data filed at 12/9/2022 6926  Gross per 24 hour   Intake 1256.32 ml   Output --   Net 1256.32 ml     Vancomycin Monitoring:  Trough:  No results for input(s): VANCOTROUGH in the last 72 hours. Random:  No results for input(s): VANCORANDOM in the last 72 hours. No results for input(s): Joaquina Gigi in the last 72 hours. Historical Cultures:  Organism   Date Value Ref Range Status   10/04/2015 Staphylococcus aureus (A)  Final     No results for input(s): BC in the last 72 hours. Vancomycin Administration Times:  Recent vancomycin administrations                     vancomycin (VANCOCIN) 1,250 mg in dextrose 5 % 250 mL IVPB (mg) 1,250 mg New Bag 12/09/22 0005             Assessment:  Patient is a 65 y.o. male who has been initiated on vancomycin. Estimated Creatinine Clearance: 95 mL/min (based on SCr of 0.9 mg/dL). Plan:  Adjust vancomycin to 1,000 mg IV every 12 hours (eAUC/ANTONIA = 519, Trough = 16.2 mcg/mL). Will check random vancomycin level tomorrow AM.  Will continue to monitor renal function. Pharmacy to follow.     Barbar Hodgkin, PharmD  PGY1 Pharmacy Resident 12/9/2022 10:01 AM

## 2022-12-09 NOTE — CARE COORDINATION
Care Coordination:  65year old male admitted with cellulitis left hand. Ortho and ID following . IV vanco and cefepime. Ortho to re evaluate later today for possible drainage. Met with patient at bedside to assesses for discharge needs. He lives alone and was independent . Works full time as . He has no hx of home care or WEST. PCP is Dr Reji Wyatt. Pharmacy is [facility] on [facility]. His care is in ER parking lot and his plan is to drive home. No needs identified at this time.

## 2022-12-09 NOTE — PROGRESS NOTES
Department of Orthopedic Surgery  Resident Progress Note      SUBJECTIVE  Patient seen and examined. Well-controlled currently on IV abx and he reports remarkable improvement. Denies NTP. OBJECTIVE    Physical    VITALS:  /72   Pulse 57   Temp 98.5 °F (36.9 °C) (Temporal)   Resp 18   SpO2 96%   MUSCULOSKELETAL:     left upper extremity:  Improvement in erythema and swelling. Localized carpal swelling on the dorsal aspect. This appears to be superficial in nature. TTP at abscess 8/10 when palpated. No pain to TTP at the dorsum aspect of the wrist  Patient able to make a complete fist without pain. Distal sensory intact: MRU  +AIN/PIN/M/R/U nerve function intact grossly  +2/4 Rad pulse  Compartments soft and compressible    Data    CBC:   Lab Results   Component Value Date/Time    WBC 10.3 12/09/2022 07:05 AM    RBC 4.94 12/09/2022 07:05 AM    HGB 15.0 12/09/2022 07:05 AM    HCT 44.5 12/09/2022 07:05 AM    MCV 90.1 12/09/2022 07:05 AM    MCH 30.4 12/09/2022 07:05 AM    MCHC 33.7 12/09/2022 07:05 AM    RDW 13.2 12/09/2022 07:05 AM     12/09/2022 07:05 AM    MPV 10.5 12/09/2022 07:05 AM     PT/INR:    Lab Results   Component Value Date/Time    PROTIME 12.2 02/18/2015 12:50 AM    INR 1.2 02/18/2015 12:50 AM       Labs  No results for input(s): BC, BLOODCULT2 in the last 72 hours. No results for input(s): CXSURG in the last 72 hours. ASSESSMENT  Left hand cellulitis    PLAN    WBAT LUE  IV abx cefepime per primary  Deep venous thrombosis prophylaxis - per primary, early mobilization  PT/OT  Pain Control: IV and PO  Monitor H&H  Wound may be maturing to subcutaneous abscess. Patient may benefit from bedside incision and drainage. We will continue to monitor patient clinically and reevaluate later today for possible drainage.   D/C Plan:  pending

## 2022-12-09 NOTE — PROGRESS NOTES
Patient seen and examined by me this morning. Patient reports his pain and redness are much improved. Left hand demonstrates area of localized erythema with a central scab overlying the ring metacarpal dorsally. There may be slight subcutaneous palpable fluid collection but no extension along the tendon sheath no deep compartment involvement clinically. Minimal discomfort with full active flexion and extension with full range of motion. Impression: Left hand infection responding well to initial antibiotic therapy. May be maturing into localized subcutaneous abscess. Reevaluate later today possible needle aspiration.

## 2022-12-09 NOTE — PROGRESS NOTES
Infectious Disease  Progress Note  [facility]    Chief Complaint: left hand pain    Subjective: left hand abscess/cellulitis    Scheduled Meds:   vancomycin  1,000 mg IntraVENous Q12H    sodium chloride flush  10 mL IntraVENous 2 times per day    enoxaparin  40 mg SubCUTAneous Daily    cefepime  2,000 mg IntraVENous Q12H     Continuous Infusions:   sodium chloride 75 mL/hr at 12/09/22 0639    sodium chloride       PRN Meds:ibuprofen, sodium chloride flush, sodium chloride, ondansetron **OR** ondansetron, magnesium hydroxide, acetaminophen **OR** acetaminophen    Patient Vitals for the past 24 hrs:   BP Temp Temp src Pulse Resp SpO2   12/09/22 1049 128/80 99 °F (37.2 °C) Temporal 62 18 99 %   12/09/22 0714 119/72 98.5 °F (36.9 °C) Temporal 57 18 96 %   12/09/22 0443 114/74 98.2 °F (36.8 °C) Oral 64 16 96 %   12/09/22 0016 117/74 97.8 °F (36.6 °C) Oral 62 17 97 %   12/08/22 1515 127/79 97.3 °F (36.3 °C) Temporal 63 18 97 %       CBC with Differential:    Lab Results   Component Value Date/Time    WBC 10.3 12/09/2022 07:05 AM    RBC 4.94 12/09/2022 07:05 AM    HGB 15.0 12/09/2022 07:05 AM    HCT 44.5 12/09/2022 07:05 AM     12/09/2022 07:05 AM    MCV 90.1 12/09/2022 07:05 AM    MCH 30.4 12/09/2022 07:05 AM    MCHC 33.7 12/09/2022 07:05 AM    RDW 13.2 12/09/2022 07:05 AM    BANDSPCT 3 10/01/2015 04:44 PM    LYMPHOPCT 13.7 12/09/2022 07:05 AM    MONOPCT 9.0 12/09/2022 07:05 AM    BASOPCT 0.4 12/09/2022 07:05 AM    MONOSABS 0.93 12/09/2022 07:05 AM    LYMPHSABS 1.41 12/09/2022 07:05 AM    EOSABS 0.08 12/09/2022 07:05 AM    BASOSABS 0.04 12/09/2022 07:05 AM     CMP:    Lab Results   Component Value Date/Time     12/09/2022 07:05 AM    K 4.3 12/09/2022 07:05 AM     12/09/2022 07:05 AM    CO2 17 12/09/2022 07:05 AM    BUN 14 12/09/2022 07:05 AM    CREATININE 0.9 12/09/2022 07:05 AM    GFRAA >60 10/04/2022 03:17 PM    LABGLOM >60 12/09/2022 07:05 AM    GLUCOSE 98 12/09/2022 07:05 AM    PROT 8.2 12/08/2022 10:53 AM    LABALBU 4.7 12/08/2022 10:53 AM    CALCIUM 9.1 12/09/2022 07:05 AM    BILITOT 1.3 12/08/2022 10:53 AM    ALKPHOS 115 12/08/2022 10:53 AM    AST 20 12/08/2022 10:53 AM    ALT 27 12/08/2022 10:53 AM       /80   Pulse 62   Temp 99 °F (37.2 °C) (Temporal)   Resp 18   SpO2 99%     Physical Exam  Const/Neuro- unchanged, no signs of acute distress, Alert  ENMT- Within Normal Limits, Normocephalic, mucous membranes pink/moist, No thrush  Neck: Neck supple  Heart- Regular, Rate, Rhythm- no murmur appreciated. Lungs- clear to ascultation. Respirations even and nonlabored. Abdomen- Soft, bowel sounds positive, non tender  Musculo/Extremities-  Equal and symmetrical, no edema. No tenderness. Skin:  Warm and dry, free from rashes.       Cultures reviewed    Radiology reviewed  No orders to display       Assessment    Left dorsal hand abscess/celllulitis    Ortho consulted     WBC 10,300    Afebrile   Plan  iv vanco   continue cefepime for now   Ortho note of  Dr De Doughertyoter  reviewed   agree    Will prob stop cefepime   espec with his past med hx of mrsa            Electronically signed by Neli Torres MD on 12/9/2022 at 12:56 PM

## 2022-12-09 NOTE — PROGRESS NOTES
Sent a Message VIA perfect serve to Dr. Araceli Livingston about patient blood pressure being 186/71.

## 2022-12-09 NOTE — PROGRESS NOTES
Department of Orthopedic Surgery  Resident Progress Note      SUBJECTIVE  Patient seen and examined. Currently on IV abx and he reports remarkable improvement. Denies NTP. OBJECTIVE    Physical    VITALS:  /79   Pulse 63   Temp 97.3 °F (36.3 °C) (Temporal)   Resp 18   SpO2 97%   MUSCULOSKELETAL:     left upper extremity:  Improvement in erythema and swelling. No pain to TTP at the dorsum aspect of the wrist  Patient able to make a complete fist without pain. Distal sensory intact: MRU  +AIN/PIN/M/R/U nerve function intact grossly  +2/4 Rad pulse  Compartments soft and compressible    Data    CBC:   Lab Results   Component Value Date/Time    WBC 13.2 12/08/2022 10:53 AM    RBC 5.10 12/08/2022 10:53 AM    HGB 15.6 12/08/2022 10:53 AM    HCT 45.3 12/08/2022 10:53 AM    MCV 88.8 12/08/2022 10:53 AM    MCH 30.6 12/08/2022 10:53 AM    MCHC 34.4 12/08/2022 10:53 AM    RDW 13.0 12/08/2022 10:53 AM     12/08/2022 10:53 AM    MPV 10.6 12/08/2022 10:53 AM     PT/INR:    Lab Results   Component Value Date/Time    PROTIME 12.2 02/18/2015 12:50 AM    INR 1.2 02/18/2015 12:50 AM       Labs  No results for input(s): BC, BLOODCULT2 in the last 72 hours. No results for input(s): CXSURG in the last 72 hours.         ASSESSMENT  Left hand cellulitis    PLAN    WBAT LUE  IV abx cefepime per primary  Deep venous thrombosis prophylaxis - per primary, early mobilization  PT/OT  Pain Control: IV and PO  Monitor H&H  D/C Plan:  pending

## 2022-12-09 NOTE — PROGRESS NOTES
Spoke with Madison Graham NP regarding patients increased anxiety and desire for something to calm him down as well as Advil instead of tylenol to help with pain because tylenol upsets his stomach.  Orders to follow as recommended by NP

## 2022-12-09 NOTE — PROGRESS NOTES
[facility]   Progress note      Subjective:    Improved left dorsum wound    Objective:    /80   Pulse 62   Temp 99 °F (37.2 °C) (Temporal)   Resp 18   SpO2 99%     In: 1616.3 [P.O.:390; I.V.:934.4]  Out: -   In: 1616.3   Out: -     General appearance: NAD, conversant  HEENT: AT/NC, MMM  Neck: FROM, supple  Lungs: Clear to auscultation  CV: RRR, no MRGs  Vasc: Radial pulses 2+  Abdomen: Soft, non-tender; no masses or HSM  Extremities: Left hand with wound which appears much improved, no compromise in range of motion wound looks improved  Skin: no rash, lesions or ulcers  Psych: Alert and oriented to person, place and time  Neuro: Alert and interactive     Recent Labs     12/08/22  1053 12/09/22  0705   WBC 13.2* 10.3   HGB 15.6 15.0   HCT 45.3 44.5    245       Recent Labs     12/08/22  1053 12/09/22  0705    135   K 4.4 4.3    103   CO2 20* 17*   BUN 15 14   CREATININE 1.1 0.9   CALCIUM 9.8 9.1       Assessment:    Principal Problem:    Cellulitis  Active Problems:    Cellulitis of left hand  Resolved Problems:    * No resolved hospital problems.  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 Luis Osorio    12/9/2022  Possible bedside incic/drainage of abscess today  Left hand wound is markedly improved  Patient still receiving IV Comycin  Okay for discharge from medicine standpoint if switch to p.o. antibiotics per ID, after I&D completed    Code Status:    Consultants:    DVT Prophylaxis   PT/OT  Discharge planning           Isaias Gamboa MD  12:26 PM  12/9/2022

## 2022-12-10 LAB
ANION GAP SERPL CALCULATED.3IONS-SCNC: 11 MMOL/L (ref 7–16)
BASOPHILS ABSOLUTE: 0.05 E9/L (ref 0–0.2)
BASOPHILS RELATIVE PERCENT: 0.7 % (ref 0–2)
BUN BLDV-MCNC: 13 MG/DL (ref 6–20)
CALCIUM SERPL-MCNC: 9.1 MG/DL (ref 8.6–10.2)
CHLORIDE BLD-SCNC: 106 MMOL/L (ref 98–107)
CO2: 23 MMOL/L (ref 22–29)
CREAT SERPL-MCNC: 0.8 MG/DL (ref 0.7–1.2)
EOSINOPHILS ABSOLUTE: 0.15 E9/L (ref 0.05–0.5)
EOSINOPHILS RELATIVE PERCENT: 2.1 % (ref 0–6)
GFR SERPL CREATININE-BSD FRML MDRD: >60 ML/MIN/1.73
GLUCOSE BLD-MCNC: 94 MG/DL (ref 74–99)
HCT VFR BLD CALC: 44.6 % (ref 37–54)
HEMOGLOBIN: 14.9 G/DL (ref 12.5–16.5)
IMMATURE GRANULOCYTES #: 0.02 E9/L
IMMATURE GRANULOCYTES %: 0.3 % (ref 0–5)
LYMPHOCYTES ABSOLUTE: 1.47 E9/L (ref 1.5–4)
LYMPHOCYTES RELATIVE PERCENT: 20.1 % (ref 20–42)
MCH RBC QN AUTO: 30 PG (ref 26–35)
MCHC RBC AUTO-ENTMCNC: 33.4 % (ref 32–34.5)
MCV RBC AUTO: 89.9 FL (ref 80–99.9)
MONOCYTES ABSOLUTE: 0.65 E9/L (ref 0.1–0.95)
MONOCYTES RELATIVE PERCENT: 8.9 % (ref 2–12)
NEUTROPHILS ABSOLUTE: 4.96 E9/L (ref 1.8–7.3)
NEUTROPHILS RELATIVE PERCENT: 67.9 % (ref 43–80)
PDW BLD-RTO: 13.2 FL (ref 11.5–15)
PLATELET # BLD: 269 E9/L (ref 130–450)
PMV BLD AUTO: 10.4 FL (ref 7–12)
POTASSIUM REFLEX MAGNESIUM: 4 MMOL/L (ref 3.5–5)
RBC # BLD: 4.96 E12/L (ref 3.8–5.8)
SODIUM BLD-SCNC: 140 MMOL/L (ref 132–146)
VANCOMYCIN RANDOM: 11.5 MCG/ML (ref 5–40)
WBC # BLD: 7.3 E9/L (ref 4.5–11.5)

## 2022-12-10 PROCEDURE — 80202 ASSAY OF VANCOMYCIN: CPT

## 2022-12-10 PROCEDURE — 6360000002 HC RX W HCPCS: Performed by: INTERNAL MEDICINE

## 2022-12-10 PROCEDURE — 87205 SMEAR GRAM STAIN: CPT

## 2022-12-10 PROCEDURE — 2580000003 HC RX 258: Performed by: INTERNAL MEDICINE

## 2022-12-10 PROCEDURE — 87070 CULTURE OTHR SPECIMN AEROBIC: CPT

## 2022-12-10 PROCEDURE — 6360000002 HC RX W HCPCS: Performed by: NURSE PRACTITIONER

## 2022-12-10 PROCEDURE — 6370000000 HC RX 637 (ALT 250 FOR IP): Performed by: FAMILY MEDICINE

## 2022-12-10 PROCEDURE — 85025 COMPLETE CBC W/AUTO DIFF WBC: CPT

## 2022-12-10 PROCEDURE — 87186 SC STD MICRODIL/AGAR DIL: CPT

## 2022-12-10 PROCEDURE — 36415 COLL VENOUS BLD VENIPUNCTURE: CPT

## 2022-12-10 PROCEDURE — 1200000000 HC SEMI PRIVATE

## 2022-12-10 PROCEDURE — 2580000003 HC RX 258: Performed by: NURSE PRACTITIONER

## 2022-12-10 PROCEDURE — 80048 BASIC METABOLIC PNL TOTAL CA: CPT

## 2022-12-10 PROCEDURE — 87077 CULTURE AEROBIC IDENTIFY: CPT

## 2022-12-10 RX ORDER — OXYCODONE HYDROCHLORIDE AND ACETAMINOPHEN 5; 325 MG/1; MG/1
1 TABLET ORAL EVERY 4 HOURS PRN
Status: DISCONTINUED | OUTPATIENT
Start: 2022-12-10 | End: 2022-12-14 | Stop reason: HOSPADM

## 2022-12-10 RX ADMIN — VANCOMYCIN HYDROCHLORIDE 1000 MG: 1 INJECTION, POWDER, LYOPHILIZED, FOR SOLUTION INTRAVENOUS at 23:46

## 2022-12-10 RX ADMIN — SODIUM CHLORIDE: 9 INJECTION, SOLUTION INTRAVENOUS at 22:10

## 2022-12-10 RX ADMIN — OXYCODONE AND ACETAMINOPHEN 1 TABLET: 5; 325 TABLET ORAL at 11:40

## 2022-12-10 RX ADMIN — IBUPROFEN 200 MG: 200 TABLET, FILM COATED ORAL at 06:34

## 2022-12-10 RX ADMIN — CEFEPIME 2000 MG: 2 INJECTION, POWDER, FOR SOLUTION INTRAVENOUS at 16:54

## 2022-12-10 RX ADMIN — SODIUM CHLORIDE, PRESERVATIVE FREE 10 ML: 5 INJECTION INTRAVENOUS at 08:30

## 2022-12-10 RX ADMIN — VANCOMYCIN HYDROCHLORIDE 1000 MG: 1 INJECTION, POWDER, LYOPHILIZED, FOR SOLUTION INTRAVENOUS at 00:25

## 2022-12-10 RX ADMIN — SODIUM CHLORIDE: 9 INJECTION, SOLUTION INTRAVENOUS at 08:30

## 2022-12-10 RX ADMIN — CEFEPIME 2000 MG: 2 INJECTION, POWDER, FOR SOLUTION INTRAVENOUS at 04:26

## 2022-12-10 RX ADMIN — VANCOMYCIN HYDROCHLORIDE 1000 MG: 1 INJECTION, POWDER, LYOPHILIZED, FOR SOLUTION INTRAVENOUS at 11:43

## 2022-12-10 ASSESSMENT — PAIN DESCRIPTION - ONSET
ONSET: ON-GOING
ONSET: ON-GOING

## 2022-12-10 ASSESSMENT — PAIN SCALES - GENERAL
PAINLEVEL_OUTOF10: 7
PAINLEVEL_OUTOF10: 8
PAINLEVEL_OUTOF10: 8
PAINLEVEL_OUTOF10: 3
PAINLEVEL_OUTOF10: 8
PAINLEVEL_OUTOF10: 5
PAINLEVEL_OUTOF10: 2

## 2022-12-10 ASSESSMENT — PAIN DESCRIPTION - PAIN TYPE
TYPE: ACUTE PAIN;SURGICAL PAIN
TYPE: ACUTE PAIN;SURGICAL PAIN

## 2022-12-10 ASSESSMENT — PAIN - FUNCTIONAL ASSESSMENT
PAIN_FUNCTIONAL_ASSESSMENT: PREVENTS OR INTERFERES SOME ACTIVE ACTIVITIES AND ADLS
PAIN_FUNCTIONAL_ASSESSMENT: PREVENTS OR INTERFERES SOME ACTIVE ACTIVITIES AND ADLS

## 2022-12-10 ASSESSMENT — PAIN DESCRIPTION - FREQUENCY
FREQUENCY: CONTINUOUS
FREQUENCY: CONTINUOUS

## 2022-12-10 ASSESSMENT — PAIN DESCRIPTION - LOCATION
LOCATION: HAND
LOCATION: HAND
LOCATION: ABDOMEN

## 2022-12-10 ASSESSMENT — PAIN DESCRIPTION - ORIENTATION
ORIENTATION: LEFT
ORIENTATION: MID
ORIENTATION: LEFT

## 2022-12-10 ASSESSMENT — PAIN DESCRIPTION - DESCRIPTORS
DESCRIPTORS: BURNING;SHOOTING;SHARP
DESCRIPTORS: BURNING;SHARP;SHOOTING
DESCRIPTORS: SORE

## 2022-12-10 NOTE — PROGRESS NOTES
[facility]   Progress note      Subjective:  Left hand in dressing  Waiting for finalization of cultures  Anxious to go home    Objective:    /66   Pulse 60   Temp 97.7 °F (36.5 °C) (Oral)   Resp 16   SpO2 96%     In: 4220.8 [P.O.:1860; I.V.:1810.5]  Out: 900   In: 4220.8   Out: 900 [Urine:900]    General appearance: NAD, conversant  HEENT: AT/NC, MMM  Neck: FROM, supple  Lungs: Clear to auscultation  CV: RRR, no MRGs  Vasc: Radial pulses 2+  Abdomen: Soft, non-tender; no masses or HSM  Extremities: Left hand with wound which appears much improved, no compromise in range of motion wound looks improved  Skin: no rash, lesions or ulcers  Psych: Alert and oriented to person, place and time  Neuro: Alert and interactive     Recent Labs     12/08/22  1053 12/09/22  0705 12/10/22  0715   WBC 13.2* 10.3 7.3   HGB 15.6 15.0 14.9   HCT 45.3 44.5 44.6    245 269       Recent Labs     12/08/22  1053 12/09/22  0705 12/10/22  0715    135 140   K 4.4 4.3 4.0    103 106   CO2 20* 17* 23   BUN 15 14 13   CREATININE 1.1 0.9 0.8   CALCIUM 9.8 9.1 9.1       Assessment:    Principal Problem:    Cellulitis  Active Problems:    Cellulitis of left hand  Resolved Problems:    * No resolved hospital problems.  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 Franny Hamilton    12/9/2022  Possible bedside incic/drainage of abscess today  Left hand wound is markedly improved  Patient still receiving IV Comycin  Okay for discharge from medicine standpoint if switch to p.o. antibiotics per ID, after I&D completed    12/10/2022  No acute events overnight  Status post I&D by orthopedics  Awaiting final cultures narrow antibiotics for discharge plan per ID    Code Status:    Consultants:    DVT Prophylaxis   PT/OT  Discharge planning           Kenyetta Layne MD  4:02 PM  12/10/2022

## 2022-12-10 NOTE — PROGRESS NOTES
Department of Orthopedic Surgery  Resident Progress Note      SUBJECTIVE  Patient seen and examined. Well-controlled currently on IV abx and he reports remarkable improvement. Denies NTP. OBJECTIVE    Physical    VITALS:  /87   Pulse 61   Temp 98.2 °F (36.8 °C) (Temporal)   Resp 18   SpO2 96%   MUSCULOSKELETAL:     left upper extremity:  Improvement in erythema and swelling. Localized swelling overlying ring metacarpal  TTP at abscess 8/10 when palpated. No pain to TTP at the dorsum aspect of the wrist  Patient able to make a complete fist without pain. Distal sensory intact: MRU  +AIN/PIN/M/R/U nerve function intact grossly  +2/4 Rad pulse  Compartments soft and compressible    Data    CBC:   Lab Results   Component Value Date/Time    WBC 7.3 12/10/2022 07:15 AM    RBC 4.96 12/10/2022 07:15 AM    HGB 14.9 12/10/2022 07:15 AM    HCT 44.6 12/10/2022 07:15 AM    MCV 89.9 12/10/2022 07:15 AM    MCH 30.0 12/10/2022 07:15 AM    MCHC 33.4 12/10/2022 07:15 AM    RDW 13.2 12/10/2022 07:15 AM     12/10/2022 07:15 AM    MPV 10.4 12/10/2022 07:15 AM     PT/INR:    Lab Results   Component Value Date/Time    PROTIME 12.2 02/18/2015 12:50 AM    INR 1.2 02/18/2015 12:50 AM       Labs  Recent Labs     12/08/22  1138 12/08/22  1524   BC 24 Hours no growth  --    BLOODCULT2  --  24 Hours no growth     No results for input(s): CXSURG in the last 72 hours. ASSESSMENT  Left hand cellulitis with abscess overlying ring metacarpal    PLAN    WBAT LUE  IV abx cefepime per primary  Performed bedside aspiration, area overlying fourth ring metacarpal was prepped with alcohol swabs. A 18-gauge sharp needle was then inserted into the abscess next to the scabbed area. 2 cc of purulent fluid was able to be withdrawn from the area. The wound was then dressed with 4 x 4's and Ace. Sample was sent for analysis.  Abscess was decompressed with only minimal swelling overlying the 4th metacarpal.   Culture and gram stain ordered  Continue elevation, if abscess worsens or fails to improve further next step would be more formal I&D at bedside  Deep venous thrombosis prophylaxis - per primary, early mobilization  PT/OT  Pain Control: IV and PO  Monitor H&H  D/C Plan:  pending

## 2022-12-10 NOTE — PROGRESS NOTES
[id number] Message sent to ortho resident regarding patient asking for something for pain management    1000: Patient asking for updates     [phone]: Awaiting medication recommendation from physicians, informed patient that I am still waiting for further instructions

## 2022-12-10 NOTE — PROGRESS NOTES
Pharmacy Consultation Note  (Antibiotic Dosing and Monitoring)    Initial consult date: 12/8/22  Consulting physician/provider: Dr. Neeta Krishnan  Drug: Vancomycin  Indication: Cellulitis    Age/  Gender Height Weight IBW  Allergy Information   57 y.o./male 177.8 cm 74.8 kg     Ideal body weight: 73 kg (160 lb 15 oz)  Adjusted ideal body weight: 73.7 kg (162 lb 9 oz)   Patient has no known allergies. Renal Function:  Recent Labs     12/08/22  1053 12/09/22  0705   BUN 15 14   CREATININE 1.1 0.9       Intake/Output Summary (Last 24 hours) at 12/10/2022 0654  Last data filed at 12/9/2022 1359  Gross per 24 hour   Intake 1080 ml   Output 400 ml   Net 680 ml       Vancomycin Monitoring:  Trough:  No results for input(s): VANCOTROUGH in the last 72 hours. Random:  No results for input(s): VANCORANDOM in the last 72 hours. Recent Labs     12/08/22  1524   BLOODCULT2 24 Hours no growth          Historical Cultures:  Organism   Date Value Ref Range Status   10/04/2015 Staphylococcus aureus (A)  Final     Recent Labs     12/08/22  1138   BC 24 Hours no growth       Vancomycin Administration Times:  Recent vancomycin administrations                     vancomycin (VANCOCIN) 1,000 mg in dextrose 5 % 250 mL IVPB ([id number]) (mg) 1,000 mg New Bag 12/10/22 0025     1,000 mg New Bag 12/09/22 1117    vancomycin (VANCOCIN) 1,250 mg in dextrose 5 % 250 mL IVPB (mg) 1,250 mg New Bag 12/09/22 0005             Assessment:  Patient is a 65 y.o. male who has been initiated on vancomycin. Estimated Creatinine Clearance: 95 mL/min (based on SCr of 0.9 mg/dL). 12/10: Day #2 vanco; Random level= 11.5 (~6h post dose); possible switch to PO Abx per ID after I and D completed. Plan:  Vancomycin to 1,000 mg IV every 12 hours (eAUC/ANTONIA = 403, Trough = 12.1 mcg/mL). Will check random vancomycin when appropriate. Will continue to monitor renal function. Pharmacy to follow.     Chao Vazquez, PharmD  PGY1 Pharmacy Resident  12/10/2022 6:55 AM

## 2022-12-10 NOTE — PROGRESS NOTES
Infectious Disease  Progress Note  [facility]    Chief Complaint: left hand pain    Subjective: left hand abscess/cellulitis    Scheduled Meds:   vancomycin  1,000 mg IntraVENous Q12H    sodium chloride flush  10 mL IntraVENous 2 times per day    enoxaparin  40 mg SubCUTAneous Daily    cefepime  2,000 mg IntraVENous Q12H     Continuous Infusions:   sodium chloride 75 mL/hr at 12/10/22 0830    sodium chloride 75 mL/hr at 12/09/22 2147     PRN Meds:ibuprofen, sodium chloride flush, sodium chloride, ondansetron **OR** ondansetron, magnesium hydroxide, acetaminophen **OR** acetaminophen    Patient Vitals for the past 24 hrs:   BP Temp Temp src Pulse Resp SpO2   12/10/22 0822 122/80 97.7 °F (36.5 °C) Oral 72 12 97 %   12/10/22 0634 113/87 98.2 °F (36.8 °C) Temporal 61 18 96 %   12/09/22 2305 (!) 117/58 97.3 °F (36.3 °C) -- 54 18 97 %   12/09/22 2100 127/76 98 °F (36.7 °C) Temporal 51 18 96 %   12/09/22 1519 116/76 98.9 °F (37.2 °C) Temporal 66 18 98 %   12/09/22 1049 128/80 99 °F (37.2 °C) Temporal 62 18 99 %       CBC with Differential:    Lab Results   Component Value Date/Time    WBC 7.3 12/10/2022 07:15 AM    RBC 4.96 12/10/2022 07:15 AM    HGB 14.9 12/10/2022 07:15 AM    HCT 44.6 12/10/2022 07:15 AM     12/10/2022 07:15 AM    MCV 89.9 12/10/2022 07:15 AM    MCH 30.0 12/10/2022 07:15 AM    MCHC 33.4 12/10/2022 07:15 AM    RDW 13.2 12/10/2022 07:15 AM    BANDSPCT 3 10/01/2015 04:44 PM    LYMPHOPCT 20.1 12/10/2022 07:15 AM    MONOPCT 8.9 12/10/2022 07:15 AM    BASOPCT 0.7 12/10/2022 07:15 AM    MONOSABS 0.65 12/10/2022 07:15 AM    LYMPHSABS 1.47 12/10/2022 07:15 AM    EOSABS 0.15 12/10/2022 07:15 AM    BASOSABS 0.05 12/10/2022 07:15 AM     CMP:    Lab Results   Component Value Date/Time     12/10/2022 07:15 AM    K 4.0 12/10/2022 07:15 AM     12/10/2022 07:15 AM    CO2 23 12/10/2022 07:15 AM    BUN 13 12/10/2022 07:15 AM    CREATININE 0.8 12/10/2022 07:15 AM    GFRAA >60 10/04/2022 03:17 PM    LABGLOM >60 12/10/2022 07:15 AM    GLUCOSE 94 12/10/2022 07:15 AM    PROT 8.2 12/08/2022 10:53 AM    LABALBU 4.7 12/08/2022 10:53 AM    CALCIUM 9.1 12/10/2022 07:15 AM    BILITOT 1.3 12/08/2022 10:53 AM    ALKPHOS 115 12/08/2022 10:53 AM    AST 20 12/08/2022 10:53 AM    ALT 27 12/08/2022 10:53 AM       /80   Pulse 72   Temp 97.7 °F (36.5 °C) (Oral)   Resp 12   SpO2 97%     Physical Exam  Const/Neuro- unchanged, no signs of acute distress, Alert  ENMT- Within Normal Limits, Normocephalic, mucous membranes pink/moist, No thrush  Neck: Neck supple  Heart- Regular, Rate, Rhythm- no murmur appreciated. Lungs- clear to ascultation. Respirations even and nonlabored. Abdomen- Soft, bowel sounds positive, non tender  Musculo/Extremities-  Equal and symmetrical, no edema. No tenderness. Skin:  Warm and dry, free from rashes.       Cultures reviewed    Radiology reviewed  No orders to display       Assessment    Left dorsal hand abscess/celllulitis    Ortho consulted     WBC 10,300    Afebrile   Plan  iv vanco   continue cefepime for now   Ortho note of  Dr Naun Santiago  reviewed   agree    Will prob stop cefepime   espec with his past med hx of mrsa   I and D today   wait for cultures           Electronically signed by Nadya Kramer MD on 12/10/2022 at 10:26 AM

## 2022-12-11 LAB
ANION GAP SERPL CALCULATED.3IONS-SCNC: 12 MMOL/L (ref 7–16)
BASOPHILS ABSOLUTE: 0.05 E9/L (ref 0–0.2)
BASOPHILS RELATIVE PERCENT: 0.7 % (ref 0–2)
BUN BLDV-MCNC: 11 MG/DL (ref 6–20)
CALCIUM SERPL-MCNC: 9.3 MG/DL (ref 8.6–10.2)
CHLORIDE BLD-SCNC: 110 MMOL/L (ref 98–107)
CO2: 22 MMOL/L (ref 22–29)
CREAT SERPL-MCNC: 0.8 MG/DL (ref 0.7–1.2)
EOSINOPHILS ABSOLUTE: 0.23 E9/L (ref 0.05–0.5)
EOSINOPHILS RELATIVE PERCENT: 3.2 % (ref 0–6)
GFR SERPL CREATININE-BSD FRML MDRD: >60 ML/MIN/1.73
GLUCOSE BLD-MCNC: 93 MG/DL (ref 74–99)
GRAM STAIN ORDERABLE: NORMAL
HCT VFR BLD CALC: 40.2 % (ref 37–54)
HEMOGLOBIN: 13.6 G/DL (ref 12.5–16.5)
IMMATURE GRANULOCYTES #: 0.01 E9/L
IMMATURE GRANULOCYTES %: 0.1 % (ref 0–5)
LYMPHOCYTES ABSOLUTE: 1.65 E9/L (ref 1.5–4)
LYMPHOCYTES RELATIVE PERCENT: 22.7 % (ref 20–42)
MCH RBC QN AUTO: 30.4 PG (ref 26–35)
MCHC RBC AUTO-ENTMCNC: 33.8 % (ref 32–34.5)
MCV RBC AUTO: 89.9 FL (ref 80–99.9)
MONOCYTES ABSOLUTE: 0.6 E9/L (ref 0.1–0.95)
MONOCYTES RELATIVE PERCENT: 8.3 % (ref 2–12)
NEUTROPHILS ABSOLUTE: 4.72 E9/L (ref 1.8–7.3)
NEUTROPHILS RELATIVE PERCENT: 65 % (ref 43–80)
PDW BLD-RTO: 13 FL (ref 11.5–15)
PLATELET # BLD: 237 E9/L (ref 130–450)
PMV BLD AUTO: 10.3 FL (ref 7–12)
POTASSIUM REFLEX MAGNESIUM: 4.2 MMOL/L (ref 3.5–5)
RBC # BLD: 4.47 E12/L (ref 3.8–5.8)
SODIUM BLD-SCNC: 144 MMOL/L (ref 132–146)
WBC # BLD: 7.3 E9/L (ref 4.5–11.5)

## 2022-12-11 PROCEDURE — 6370000000 HC RX 637 (ALT 250 FOR IP): Performed by: FAMILY MEDICINE

## 2022-12-11 PROCEDURE — 36415 COLL VENOUS BLD VENIPUNCTURE: CPT

## 2022-12-11 PROCEDURE — 2580000003 HC RX 258: Performed by: NURSE PRACTITIONER

## 2022-12-11 PROCEDURE — 2580000003 HC RX 258: Performed by: INTERNAL MEDICINE

## 2022-12-11 PROCEDURE — 1200000000 HC SEMI PRIVATE

## 2022-12-11 PROCEDURE — 6360000002 HC RX W HCPCS: Performed by: INTERNAL MEDICINE

## 2022-12-11 PROCEDURE — 6360000002 HC RX W HCPCS: Performed by: NURSE PRACTITIONER

## 2022-12-11 PROCEDURE — 85025 COMPLETE CBC W/AUTO DIFF WBC: CPT

## 2022-12-11 PROCEDURE — 80048 BASIC METABOLIC PNL TOTAL CA: CPT

## 2022-12-11 RX ORDER — LORAZEPAM 2 MG/ML
0.5 INJECTION INTRAMUSCULAR ONCE
Status: COMPLETED | OUTPATIENT
Start: 2022-12-11 | End: 2022-12-11

## 2022-12-11 RX ADMIN — SODIUM CHLORIDE: 9 INJECTION, SOLUTION INTRAVENOUS at 11:48

## 2022-12-11 RX ADMIN — VANCOMYCIN HYDROCHLORIDE 1000 MG: 1 INJECTION, POWDER, LYOPHILIZED, FOR SOLUTION INTRAVENOUS at 11:47

## 2022-12-11 RX ADMIN — OXYCODONE AND ACETAMINOPHEN 1 TABLET: 5; 325 TABLET ORAL at 06:21

## 2022-12-11 RX ADMIN — IBUPROFEN 200 MG: 200 TABLET, FILM COATED ORAL at 11:50

## 2022-12-11 RX ADMIN — LORAZEPAM 0.5 MG: 2 INJECTION INTRAMUSCULAR; INTRAVENOUS at 14:30

## 2022-12-11 RX ADMIN — SODIUM CHLORIDE, PRESERVATIVE FREE 10 ML: 5 INJECTION INTRAVENOUS at 09:42

## 2022-12-11 RX ADMIN — CEFEPIME 2000 MG: 2 INJECTION, POWDER, FOR SOLUTION INTRAVENOUS at 04:20

## 2022-12-11 ASSESSMENT — PAIN SCALES - GENERAL
PAINLEVEL_OUTOF10: 2
PAINLEVEL_OUTOF10: 8
PAINLEVEL_OUTOF10: 2
PAINLEVEL_OUTOF10: 4

## 2022-12-11 ASSESSMENT — PAIN DESCRIPTION - LOCATION
LOCATION: HAND
LOCATION: HAND

## 2022-12-11 ASSESSMENT — PAIN - FUNCTIONAL ASSESSMENT: PAIN_FUNCTIONAL_ASSESSMENT: ACTIVITIES ARE NOT PREVENTED

## 2022-12-11 ASSESSMENT — PAIN DESCRIPTION - DESCRIPTORS
DESCRIPTORS: DISCOMFORT;ACHING
DESCRIPTORS: ACHING;DISCOMFORT;SORE

## 2022-12-11 ASSESSMENT — PAIN DESCRIPTION - ORIENTATION: ORIENTATION: LEFT

## 2022-12-11 NOTE — PROGRESS NOTES
Infectious Disease  Progress Note  [facility]    Chief Complaint: left hand pain    Subjective: left hand abscess/cellulitis    Scheduled Meds:   vancomycin  1,000 mg IntraVENous Q12H    sodium chloride flush  10 mL IntraVENous 2 times per day    enoxaparin  40 mg SubCUTAneous Daily     Continuous Infusions:   sodium chloride 75 mL/hr at 12/10/22 2210    sodium chloride 75 mL/hr at 12/09/22 2147     PRN Meds:oxyCODONE-acetaminophen, ibuprofen, sodium chloride flush, sodium chloride, ondansetron **OR** ondansetron, magnesium hydroxide, acetaminophen **OR** acetaminophen    Patient Vitals for the past 24 hrs:   BP Temp Temp src Pulse Resp SpO2   12/11/22 0807 131/89 97.3 °F (36.3 °C) Oral 76 12 97 %   12/11/22 0621 -- -- -- -- 18 --   12/11/22 0415 111/63 98.8 °F (37.1 °C) Temporal 53 14 97 %   12/10/22 1915 117/66 98.3 °F (36.8 °C) Oral 59 16 95 %   12/10/22 1626 124/79 97.7 °F (36.5 °C) Oral 51 12 96 %   12/10/22 1210 -- -- -- -- 16 --   12/10/22 1140 -- -- -- -- 16 --   12/10/22 1038 123/66 97.7 °F (36.5 °C) Oral 60 12 96 %       CBC with Differential:    Lab Results   Component Value Date/Time    WBC 7.3 12/11/2022 06:09 AM    RBC 4.47 12/11/2022 06:09 AM    HGB 13.6 12/11/2022 06:09 AM    HCT 40.2 12/11/2022 06:09 AM     12/11/2022 06:09 AM    MCV 89.9 12/11/2022 06:09 AM    MCH 30.4 12/11/2022 06:09 AM    MCHC 33.8 12/11/2022 06:09 AM    RDW 13.0 12/11/2022 06:09 AM    BANDSPCT 3 10/01/2015 04:44 PM    LYMPHOPCT 22.7 12/11/2022 06:09 AM    MONOPCT 8.3 12/11/2022 06:09 AM    BASOPCT 0.7 12/11/2022 06:09 AM    MONOSABS 0.60 12/11/2022 06:09 AM    LYMPHSABS 1.65 12/11/2022 06:09 AM    EOSABS 0.23 12/11/2022 06:09 AM    BASOSABS 0.05 12/11/2022 06:09 AM     CMP:    Lab Results   Component Value Date/Time     12/11/2022 06:09 AM    K 4.2 12/11/2022 06:09 AM     12/11/2022 06:09 AM    CO2 22 12/11/2022 06:09 AM    BUN 11 12/11/2022 06:09 AM    CREATININE 0.8 12/11/2022 06:09 AM    GFRAA >60 10/04/2022 03:17 PM    LABGLOM >60 12/11/2022 06:09 AM    GLUCOSE 93 12/11/2022 06:09 AM    PROT 8.2 12/08/2022 10:53 AM    LABALBU 4.7 12/08/2022 10:53 AM    CALCIUM 9.3 12/11/2022 06:09 AM    BILITOT 1.3 12/08/2022 10:53 AM    ALKPHOS 115 12/08/2022 10:53 AM    AST 20 12/08/2022 10:53 AM    ALT 27 12/08/2022 10:53 AM       /89   Pulse 76   Temp 97.3 °F (36.3 °C) (Oral)   Resp 12   SpO2 97%     Physical Exam  Const/Neuro- unchanged, no signs of acute distress, Alert  ENMT- Within Normal Limits, Normocephalic, mucous membranes pink/moist, No thrush  Neck: Neck supple  Heart- Regular, Rate, Rhythm- no murmur appreciated. Lungs- clear to ascultation. Respirations even and nonlabored. Abdomen- Soft, bowel sounds positive, non tender  Musculo/Extremities-  Equal and symmetrical, no edema. No tenderness. Skin:  Warm and dry, free from rashes.       Cultures reviewed    Radiology reviewed  MRI HAND LEFT WO CONTRAST    (Results Pending)       Assessment    Left dorsal hand abscess/celllulitis    Ortho consulted     WBC 7300  Gm stain gm pos cocci in clusters  Will get MRI today to rule out osteo    Afebrile   Plan  iv vanco   stop cefepime           Electronically signed by Cayden Garcia MD on 12/11/2022 at 9:29 AM

## 2022-12-11 NOTE — PROGRESS NOTES
Pharmacy Consultation Note  (Antibiotic Dosing and Monitoring)    Initial consult date: 12/8/22  Consulting physician/provider: Dr. Rickey Boss  Drug: Vancomycin  Indication: Cellulitis    Age/  Gender Height Weight IBW  Allergy Information   57 y.o./male 177.8 cm 74.8 kg     Ideal body weight: 73 kg (160 lb 15 oz)  Adjusted ideal body weight: 73.7 kg (162 lb 9 oz)   Patient has no known allergies. Renal Function:  Recent Labs     12/09/22  0705 12/10/22  0715 12/11/22  0609   BUN 14 13 11   CREATININE 0.9 0.8 0.8       Intake/Output Summary (Last 24 hours) at 12/11/2022 0901  Last data filed at 12/10/2022 1407  Gross per 24 hour   Intake 780 ml   Output 500 ml   Net 280 ml       Vancomycin Monitoring:  Trough:  No results for input(s): VANCOTROUGH in the last 72 hours. Random:    Recent Labs     12/10/22  0715   VANCORANDOM 11.5       Recent Labs     12/08/22  1524   BLOODCULT2 24 Hours no growth          Historical Cultures:  Organism   Date Value Ref Range Status   10/04/2015 Staphylococcus aureus (A)  Final     Recent Labs     12/08/22  1138   BC 24 Hours no growth         Vancomycin Administration Times:  Recent vancomycin administrations                     vancomycin (VANCOCIN) 1,000 mg in dextrose 5 % 250 mL IVPB ([id number]) (mg) 1,000 mg New Bag 12/10/22 2346     1,000 mg New Bag  1143     1,000 mg New Bag  0025     1,000 mg New Bag 12/09/22 1117    vancomycin (VANCOCIN) 1,250 mg in dextrose 5 % 250 mL IVPB (mg) 1,250 mg New Bag 12/09/22 0005             Assessment:  Patient is a 65 y.o. male who has been initiated on vancomycin. Estimated Creatinine Clearance: 106 mL/min (based on SCr of 0.8 mg/dL). 12/10: Day #2 vanco; Random level= 11.5 (~6h post dose); possible switch to PO Abx per ID after I and D completed.    12/11: Day #3; bedside aspiration performed; sample sent for analysis; per IM: awaiting cultures to narrow Abx for discharge; WBC: 7.3; afebrile (97.7-98.8F)    Plan:  Cont vancomycin 1,000 mg IV every 12 hours (eAUC/ANTONIA = 404, Trough = 12.1 mcg/mL). Will check random vancomycin when appropriate. Will continue to monitor renal function. Pharmacy to follow.     Mark Lopez, PharmD  PGY1 Pharmacy Resident  12/11/2022 9:01 AM

## 2022-12-11 NOTE — PROGRESS NOTES
[facility]   Progress note      Subjective:  Extremely anxious and teary regarding his hand feeling tight and worse today    Objective:    BP (!) 130/92   Pulse 65   Temp 98.3 °F (36.8 °C) (Temporal)   Resp 16   SpO2 95%     In: 3740.8 [P.O.:1380; I.V.:1810.5]  Out: 500   In: 3740.8   Out: 500 [Urine:500]    General appearance: NAD, conversant  HEENT: AT/NC, MMM  Neck: FROM, supple  Lungs: Clear to auscultation  CV: RRR, no MRGs  Vasc: Radial pulses 2+  Abdomen: Soft, non-tender; no masses or HSM  Extremities: Slightly more swollen hand, erythema surrounding wound site  Intact range of motion of wrist and elbow, slightly more tight on finger  Skin: no rash, lesions or ulcers  Psych: Alert and oriented to person, place and time  Neuro: Alert and interactive     Recent Labs     12/09/22  0705 12/10/22  0715 12/11/22  0609   WBC 10.3 7.3 7.3   HGB 15.0 14.9 13.6   HCT 44.5 44.6 40.2    269 237       Recent Labs     12/09/22  0705 12/10/22  0715 12/11/22  0609    140 144   K 4.3 4.0 4.2    106 110*   CO2 17* 23 22   BUN 14 13 11   CREATININE 0.9 0.8 0.8   CALCIUM 9.1 9.1 9.3       Assessment:    Principal Problem:    Cellulitis  Active Problems:    Cellulitis of left hand  Resolved Problems:    * No resolved hospital problems.  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 Johnnie Kayser    12/9/2022  Possible bedside incic/drainage of abscess today  Left hand wound is markedly improved  Patient still receiving IV Comycin  Okay for discharge from medicine standpoint if switch to p.o. antibiotics per ID, after I&D completed    12/10/2022  No acute events overnight  Status post I&D by orthopedics  Awaiting final cultures narrow antibiotics for discharge plan per ID    12/11/2022  Remains on IV vancomycin, cefepime discontinued  He feels that his left hand is worse today after fine-needle aspiration was performed  Hopeful for discharge later today or tomorrow once cultures return and antibiotic switched to p.o.   Will discuss with ID  Ativan 0.5 p.o. for severe anxiety    Code Status:    Consultants:    DVT Prophylaxis   PT/OT  Discharge planning           Baby Seip, MD  1:16 PM  12/11/2022

## 2022-12-11 NOTE — PROGRESS NOTES
Department of Orthopedic Surgery  Resident Progress Note      SUBJECTIVE  Patient seen and examined. Well-controlled currently on IV abx and he reports remarkable improvement. Denies NTP. OBJECTIVE    Physical    VITALS:  /63   Pulse 53   Temp 98.8 °F (37.1 °C) (Temporal)   Resp 18   SpO2 97%   MUSCULOSKELETAL:     left upper extremity:  Open wound from needle aspiration. Associated erythema. Mild swelling overlying ring metacarpal  Mild TTP over 4th metacarpal  No pain to TTP at the dorsum aspect of the wrist  Patient able to make a complete fist without pain. Distal sensory intact: MRU  +AIN/PIN/M/R/U nerve function intact grossly  +2/4 Rad pulse  Compartments soft and compressible    Data    CBC:   Lab Results   Component Value Date/Time    WBC 7.3 12/11/2022 06:09 AM    RBC 4.47 12/11/2022 06:09 AM    HGB 13.6 12/11/2022 06:09 AM    HCT 40.2 12/11/2022 06:09 AM    MCV 89.9 12/11/2022 06:09 AM    MCH 30.4 12/11/2022 06:09 AM    MCHC 33.8 12/11/2022 06:09 AM    RDW 13.0 12/11/2022 06:09 AM     12/11/2022 06:09 AM    MPV 10.3 12/11/2022 06:09 AM     PT/INR:    Lab Results   Component Value Date/Time    PROTIME 12.2 02/18/2015 12:50 AM    INR 1.2 02/18/2015 12:50 AM       Labs  Recent Labs     12/08/22  1138 12/08/22  1524   BC 24 Hours no growth  --    BLOODCULT2  --  24 Hours no growth     No results for input(s): CXSURG in the last 72 hours.         ASSESSMENT  Left hand cellulitis with abscess overlying ring metacarpal    PLAN    WBAT LUE  IV abx cefepime per primary  Awaiting aspiration labs  Deep venous thrombosis prophylaxis - per primary, early mobilization  PT/OT  Pain Control:  PO  D/C Plan:  pending

## 2022-12-12 ENCOUNTER — APPOINTMENT (OUTPATIENT)
Dept: MRI IMAGING | Age: 57
DRG: 603 | End: 2022-12-12
Payer: COMMERCIAL

## 2022-12-12 LAB
CREAT SERPL-MCNC: 0.7 MG/DL (ref 0.7–1.2)
GFR SERPL CREATININE-BSD FRML MDRD: >60 ML/MIN/1.73

## 2022-12-12 PROCEDURE — 1200000000 HC SEMI PRIVATE

## 2022-12-12 PROCEDURE — 73218 MRI UPPER EXTREMITY W/O DYE: CPT

## 2022-12-12 PROCEDURE — 6360000002 HC RX W HCPCS: Performed by: INTERNAL MEDICINE

## 2022-12-12 PROCEDURE — 82565 ASSAY OF CREATININE: CPT

## 2022-12-12 PROCEDURE — 6370000000 HC RX 637 (ALT 250 FOR IP): Performed by: FAMILY MEDICINE

## 2022-12-12 PROCEDURE — 2580000003 HC RX 258: Performed by: INTERNAL MEDICINE

## 2022-12-12 PROCEDURE — 36415 COLL VENOUS BLD VENIPUNCTURE: CPT

## 2022-12-12 RX ADMIN — VANCOMYCIN HYDROCHLORIDE 1000 MG: 1 INJECTION, POWDER, LYOPHILIZED, FOR SOLUTION INTRAVENOUS at 11:59

## 2022-12-12 RX ADMIN — VANCOMYCIN HYDROCHLORIDE 1000 MG: 1 INJECTION, POWDER, LYOPHILIZED, FOR SOLUTION INTRAVENOUS at 00:01

## 2022-12-12 RX ADMIN — IBUPROFEN 200 MG: 200 TABLET, FILM COATED ORAL at 10:29

## 2022-12-12 RX ADMIN — OXYCODONE AND ACETAMINOPHEN 1 TABLET: 5; 325 TABLET ORAL at 18:00

## 2022-12-12 ASSESSMENT — PAIN SCALES - GENERAL
PAINLEVEL_OUTOF10: 3
PAINLEVEL_OUTOF10: 8
PAINLEVEL_OUTOF10: 6

## 2022-12-12 ASSESSMENT — PAIN DESCRIPTION - LOCATION
LOCATION: HAND
LOCATION: HAND

## 2022-12-12 ASSESSMENT — PAIN DESCRIPTION - DESCRIPTORS
DESCRIPTORS: POUNDING;THROBBING
DESCRIPTORS: ACHING;SORE;NAGGING

## 2022-12-12 ASSESSMENT — PAIN - FUNCTIONAL ASSESSMENT: PAIN_FUNCTIONAL_ASSESSMENT: PREVENTS OR INTERFERES SOME ACTIVE ACTIVITIES AND ADLS

## 2022-12-12 ASSESSMENT — PAIN DESCRIPTION - ORIENTATION
ORIENTATION: LEFT
ORIENTATION: RIGHT

## 2022-12-12 NOTE — PROGRESS NOTES
Pharmacy Consultation Note  (Antibiotic Dosing and Monitoring)    Initial consult date: 12/8/22  Consulting physician/provider: Dr. Nadia Lloyd  Drug: Vancomycin  Indication: Cellulitis    Age/  Gender Height Weight IBW  Allergy Information   57 y.o./male 177.8 cm 74.8 kg     Ideal body weight: 73 kg (160 lb 15 oz)  Adjusted ideal body weight: 73.7 kg (162 lb 9 oz)   Patient has no known allergies. Renal Function:  Recent Labs     12/10/22  0715 12/11/22  0609 12/12/22  0705   BUN 13 11  --    CREATININE 0.8 0.8 0.7       Intake/Output Summary (Last 24 hours) at 12/12/2022 1510  Last data filed at 12/12/2022 1507  Gross per 24 hour   Intake 5347.03 ml   Output --   Net 5347.03 ml       Vancomycin Monitoring:  Trough:  No results for input(s): VANCOTROUGH in the last 72 hours. Random:    Recent Labs     12/10/22  0715   VANCORANDOM 11.5         No results for input(s): Ilda Courts in the last 72 hours. Historical Cultures:  Organism   Date Value Ref Range Status   10/04/2015 Staphylococcus aureus (A)  Final     No results for input(s): BC in the last 72 hours. Vancomycin Administration Times:  Recent vancomycin administrations                     vancomycin (VANCOCIN) 1,000 mg in dextrose 5 % 250 mL IVPB ([id number]) (mg) 1,000 mg New Bag 12/12/22 1159     1,000 mg New Bag  0001     1,000 mg New Bag 12/11/22 1147     1,000 mg New Bag 12/10/22 2346     1,000 mg New Bag  1143     1,000 mg New Bag  0025                 Assessment:  Patient is a 65 y.o. male who has been initiated on vancomycin. Estimated Creatinine Clearance: 122 mL/min (based on SCr of 0.7 mg/dL). To dose vancomycin, pharmacy will be utilizing traditional method targeting a trough of 15-20 mcg/mL due to possible osteomyelitis. 12/10: Day #2 vanco; Random level= 11.5 (~6h post dose); possible switch to PO Abx per ID after I and D completed.    12/11: Day #3; bedside aspiration performed; sample sent for analysis; per IM: awaiting cultures to narrow Abx for discharge; WBC: 7.3; afebrile (97.7-98.8F)  12/12: Day # 4 of vancomycin. Scr: 0.7 mg/dL. Body fluid culture = in process. Preliminary 2/2 Blood cultures = no growth to date. MRI of left hand to rule out osteomyelitis = in process. Plan:  Continue vancomycin 1,000 mg IV every 12 hours   Will order random vancomycin level with 12/13 AM labs. Will continue to monitor renal function. Pharmacy to follow.     Thank you for the consult,     Cinda Castleman, PharmD, [address]  PGY1 Pharmacy Resident     12/12/2022 3:21 PM

## 2022-12-12 NOTE — PROGRESS NOTES
Call placed to MRI for patient's scheduled hand MRI, was informed Lakisha Peguero will try too get to him\" the department is backed up.  Patient is awaiting MRI so that he can possibly be discharged on different antibiotics     Diana Shannon RN

## 2022-12-12 NOTE — PROGRESS NOTES
Department of Orthopedic Surgery  Resident Progress Note      SUBJECTIVE  Patient seen and examined. Pain Well-controlled. currently on IV abx. Denies acute fever, chills, nausea, vomiting , chest pain and shortness of breath. OBJECTIVE    Physical    VITALS:  /82   Pulse 67   Temp 98.6 °F (37 °C) (Temporal)   Resp 16   SpO2 96%   MUSCULOSKELETAL:     left upper extremity:  Dressing CDI  Mild TTP over 4th metacarpal  No pain to TTP at the dorsum aspect of the wrist  Patient able to make a complete fist without pain. Distal sensory intact: MRU  +AIN/PIN/M/R/U nerve function intact grossly  +2/4 Rad pulse  Compartments soft and compressible    Data    CBC:   Lab Results   Component Value Date/Time    WBC 7.3 12/11/2022 06:09 AM    RBC 4.47 12/11/2022 06:09 AM    HGB 13.6 12/11/2022 06:09 AM    HCT 40.2 12/11/2022 06:09 AM    MCV 89.9 12/11/2022 06:09 AM    MCH 30.4 12/11/2022 06:09 AM    MCHC 33.8 12/11/2022 06:09 AM    RDW 13.0 12/11/2022 06:09 AM     12/11/2022 06:09 AM    MPV 10.3 12/11/2022 06:09 AM     PT/INR:    Lab Results   Component Value Date/Time    PROTIME 12.2 02/18/2015 12:50 AM    INR 1.2 02/18/2015 12:50 AM       Labs  No results for input(s): BC, BLOODCULT2 in the last 72 hours. No results for input(s): CXSURG in the last 72 hours. ASSESSMENT  Left hand cellulitis with abscess overlying ring metacarpal    PLAN    WBAT LUE  IV abx vanc per primary  MRI per ID to rule out osteo  Awaiting aspiration labs  Deep venous thrombosis prophylaxis - per primary, early mobilization  PT/OT  Pain Control:  PO  D/C Plan:  Patient can be discharged when medically stable and have made the appropriate physical therapy gains. Patient will follow up in office in 2 weeks for repeat Xrs and evaluation.

## 2022-12-12 NOTE — PROGRESS NOTES
[facility]   Progress note      Subjective:  Patient is awake and alert  Sitting up in bed without complaints  Denies chest pain, sob    Objective:    /81   Pulse 57   Temp 97.8 °F (36.6 °C) (Temporal)   Resp 16   SpO2 96%     In: 960 [P.O.:960]  Out: -   In: 960   Out: -     General appearance: NAD, conversant  HEENT: AT/NC, MMM  Neck: FROM, supple  Lungs: Clear to auscultation  CV: RRR, no MRGs  Vasc: Radial pulses 2+  Abdomen: Soft, non-tender; no masses or HSM  Extremities: Hand has improved  Intact range of motion of wrist and elbow, slightly more tight on finger  Skin: no rash, lesions or ulcers  Psych: Alert and oriented to person, place and time  Neuro: Alert and interactive     Recent Labs     12/10/22  0715 12/11/22  0609   WBC 7.3 7.3   HGB 14.9 13.6   HCT 44.6 40.2    237       Recent Labs     12/10/22  0715 12/11/22  0609 12/12/22  0705    144  --    K 4.0 4.2  --     110*  --    CO2 23 22  --    BUN 13 11  --    CREATININE 0.8 0.8 0.7   CALCIUM 9.1 9.3  --        Assessment:    Principal Problem:    Cellulitis  Active Problems:    Cellulitis of left hand  Resolved Problems:    * No resolved hospital problems.  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 Delfina Gold    12/9/2022  Possible bedside incic/drainage of abscess today  Left hand wound is markedly improved  Patient still receiving IV Comycin  Okay for discharge from medicine standpoint if switch to p.o. antibiotics per ID, after I&D completed    12/10/2022  No acute events overnight  Status post I&D by orthopedics  Awaiting final cultures narrow antibiotics for discharge plan per ID    12/11/2022  Remains on IV vancomycin, cefepime discontinued  He feels that his left hand is worse today after fine-needle aspiration was performed  Hopeful for discharge later today or tomorrow once cultures return and antibiotic switched to p.o.   Will discuss with ID  Ativan 0.5 p.o. for severe anxiety    12/12/2022  Continue with vancomycin  Mri hand to rule out osteomyelitis  Once atb's are finalized and mri is complete patient may be discharged later today    Code Status:  Full  Consultants: ID, Ortho     DVT Prophylaxis   PT/OT  Discharge planning     FAISAL Montero CNP  1:38 PM  12/12/2022

## 2022-12-13 LAB
BLOOD CULTURE, ROUTINE: NORMAL
CULTURE, BLOOD 2: NORMAL
VANCOMYCIN RANDOM: 13.9 MCG/ML (ref 5–40)

## 2022-12-13 PROCEDURE — 6370000000 HC RX 637 (ALT 250 FOR IP): Performed by: NURSE PRACTITIONER

## 2022-12-13 PROCEDURE — 2580000003 HC RX 258: Performed by: INTERNAL MEDICINE

## 2022-12-13 PROCEDURE — 2580000003 HC RX 258: Performed by: NURSE PRACTITIONER

## 2022-12-13 PROCEDURE — 1200000000 HC SEMI PRIVATE

## 2022-12-13 PROCEDURE — 6360000002 HC RX W HCPCS: Performed by: INTERNAL MEDICINE

## 2022-12-13 PROCEDURE — 80202 ASSAY OF VANCOMYCIN: CPT

## 2022-12-13 PROCEDURE — 6360000002 HC RX W HCPCS: Performed by: NURSE PRACTITIONER

## 2022-12-13 PROCEDURE — 6370000000 HC RX 637 (ALT 250 FOR IP): Performed by: INTERNAL MEDICINE

## 2022-12-13 PROCEDURE — 36415 COLL VENOUS BLD VENIPUNCTURE: CPT

## 2022-12-13 RX ORDER — LINEZOLID 600 MG/1
600 TABLET, FILM COATED ORAL EVERY 12 HOURS SCHEDULED
Status: DISCONTINUED | OUTPATIENT
Start: 2022-12-13 | End: 2022-12-14 | Stop reason: HOSPADM

## 2022-12-13 RX ORDER — LINEZOLID 600 MG/1
600 TABLET, FILM COATED ORAL 2 TIMES DAILY
Qty: 28 TABLET | Refills: 0 | Status: SHIPPED | OUTPATIENT
Start: 2022-12-13 | End: 2022-12-27

## 2022-12-13 RX ADMIN — LINEZOLID 600 MG: 600 TABLET, FILM COATED ORAL at 21:59

## 2022-12-13 RX ADMIN — VANCOMYCIN HYDROCHLORIDE 1000 MG: 1 INJECTION, POWDER, LYOPHILIZED, FOR SOLUTION INTRAVENOUS at 11:58

## 2022-12-13 RX ADMIN — ONDANSETRON 4 MG: 4 TABLET, ORALLY DISINTEGRATING ORAL at 16:52

## 2022-12-13 RX ADMIN — VANCOMYCIN HYDROCHLORIDE 1000 MG: 1 INJECTION, POWDER, LYOPHILIZED, FOR SOLUTION INTRAVENOUS at 00:22

## 2022-12-13 RX ADMIN — ENOXAPARIN SODIUM 40 MG: 100 INJECTION SUBCUTANEOUS at 08:56

## 2022-12-13 RX ADMIN — SODIUM CHLORIDE: 9 INJECTION, SOLUTION INTRAVENOUS at 05:04

## 2022-12-13 ASSESSMENT — PAIN SCALES - WONG BAKER: WONGBAKER_NUMERICALRESPONSE: 0

## 2022-12-13 NOTE — PROGRESS NOTES
Per yesterday's progress note, patient can be discharge when medically stable and have appropriate antibiotic plan. Patient will follow up in office in 1 week for repeat evaluation.    No bony involvement seen on the MRI

## 2022-12-13 NOTE — PROGRESS NOTES
[facility]   Progress note      Subjective:  Patient is awake and alert, very anxious on exam.  Sitting up in bed without complaints  Denies chest pain, sob    Objective:    BP (!) 139/92   Pulse 91   Temp 97.5 °F (36.4 °C) (Temporal)   Resp 16   Ht 5' 10\" (1.778 m)   Wt 165 lb (74.8 kg)   SpO2 98%   BMI 23.68 kg/m²     In: 7497.2 [P.O.:720; I.V.:5142.2]  Out: -   In: 7497.2   Out: -     General appearance: NAD, conversant  HEENT: AT/NC, MMM  Neck: FROM, supple  Lungs: Clear to auscultation  CV: RRR, no MRGs  Vasc: Radial pulses 2+  Abdomen: Soft, non-tender; no masses or HSM  Extremities: Hand has improved  Intact range of motion of wrist and elbow, slightly more tight on finger  Skin: no rash, lesions or ulcers  Psych: Alert and oriented to person, place and time  Neuro: Alert and interactive     Recent Labs     12/11/22  0609   WBC 7.3   HGB 13.6   HCT 40.2          Recent Labs     12/11/22  0609 12/12/22  0705     --    K 4.2  --    *  --    CO2 22  --    BUN 11  --    CREATININE 0.8 0.7   CALCIUM 9.3  --        Assessment:    Principal Problem:    Cellulitis  Active Problems:    Cellulitis of left hand  Resolved Problems:    * No resolved hospital problems.  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 Delfina Gold    12/9/2022  Possible bedside incic/drainage of abscess today  Left hand wound is markedly improved  Patient still receiving IV Comycin  Okay for discharge from medicine standpoint if switch to p.o. antibiotics per ID, after I&D completed    12/10/2022  No acute events overnight  Status post I&D by orthopedics  Awaiting final cultures narrow antibiotics for discharge plan per ID    12/11/2022  Remains on IV vancomycin, cefepime discontinued  He feels that his left hand is worse today after fine-needle aspiration was performed  Hopeful for discharge later today or tomorrow once cultures return and antibiotic switched to p.o. Will discuss with ID  Ativan 0.5 p.o. for severe anxiety    12/12/2022  Continue with vancomycin  Mri hand to rule out osteomyelitis  Once atb's are finalized and mri is complete patient may be discharged later today    12/13/2022  MRI hand reveals abscess  Await orthopedic surgery input on MRI  Continue IV atb's per ID.   Patient extremely anxious    Code Status:  Full  Consultants: ID, Ortho     DVT Prophylaxis - Lovenox  PT/OT  Discharge planning     FAISAL Medrano - CNP  2:59 PM  12/13/2022

## 2022-12-13 NOTE — PROGRESS NOTES
Infectious Disease  Progress Note  [facility]    Chief Complaint: left hand pain    Subjective: left hand abscess/cellulitis    Scheduled Meds:   vancomycin  1,000 mg IntraVENous Q12H    sodium chloride flush  10 mL IntraVENous 2 times per day    enoxaparin  40 mg SubCUTAneous Daily     Continuous Infusions:   sodium chloride 75 mL/hr at 12/13/22 0504    sodium chloride 75 mL/hr at 12/09/22 2147     PRN Meds:oxyCODONE-acetaminophen, ibuprofen, sodium chloride flush, sodium chloride, ondansetron **OR** ondansetron, magnesium hydroxide, acetaminophen **OR** acetaminophen    Patient Vitals for the past 24 hrs:   BP Temp Temp src Pulse Resp SpO2 Height Weight   12/13/22 0800 (!) 139/92 97.5 °F (36.4 °C) Temporal 91 -- 98 % -- --   12/13/22 0500 130/76 -- Temporal 69 -- 97 % -- --   12/12/22 2130 -- -- -- -- -- -- 5' 10\" (1.778 m) 165 lb (74.8 kg)   12/12/22 1938 122/72 97.6 °F (36.4 °C) Temporal 65 16 96 % -- --   12/12/22 1800 -- -- -- -- 16 -- -- --   12/12/22 1522 137/86 98 °F (36.7 °C) Temporal 57 16 98 % -- --       CBC with Differential:    Lab Results   Component Value Date/Time    WBC 7.3 12/11/2022 06:09 AM    RBC 4.47 12/11/2022 06:09 AM    HGB 13.6 12/11/2022 06:09 AM    HCT 40.2 12/11/2022 06:09 AM     12/11/2022 06:09 AM    MCV 89.9 12/11/2022 06:09 AM    MCH 30.4 12/11/2022 06:09 AM    MCHC 33.8 12/11/2022 06:09 AM    RDW 13.0 12/11/2022 06:09 AM    BANDSPCT 3 10/01/2015 04:44 PM    LYMPHOPCT 22.7 12/11/2022 06:09 AM    MONOPCT 8.3 12/11/2022 06:09 AM    BASOPCT 0.7 12/11/2022 06:09 AM    MONOSABS 0.60 12/11/2022 06:09 AM    LYMPHSABS 1.65 12/11/2022 06:09 AM    EOSABS 0.23 12/11/2022 06:09 AM    BASOSABS 0.05 12/11/2022 06:09 AM     CMP:    Lab Results   Component Value Date/Time     12/11/2022 06:09 AM    K 4.2 12/11/2022 06:09 AM     12/11/2022 06:09 AM    CO2 22 12/11/2022 06:09 AM    BUN 11 12/11/2022 06:09 AM    CREATININE 0.7 12/12/2022 07:05 AM    GFRAA >60 10/04/2022 03:17 PM LABGLOM >60 12/12/2022 07:05 AM    GLUCOSE 93 12/11/2022 06:09 AM    PROT 8.2 12/08/2022 10:53 AM    LABALBU 4.7 12/08/2022 10:53 AM    CALCIUM 9.3 12/11/2022 06:09 AM    BILITOT 1.3 12/08/2022 10:53 AM    ALKPHOS 115 12/08/2022 10:53 AM    AST 20 12/08/2022 10:53 AM    ALT 27 12/08/2022 10:53 AM       BP (!) 139/92   Pulse 91   Temp 97.5 °F (36.4 °C) (Temporal)   Resp 16   Ht 5' 10\" (1.778 m)   Wt 165 lb (74.8 kg)   SpO2 98%   BMI 23.68 kg/m²     Physical Exam  Const/Neuro- unchanged, no signs of acute distress, Alert  ENMT- Within Normal Limits, Normocephalic, mucous membranes pink/moist, No thrush  Neck: Neck supple  Heart- Regular, Rate, Rhythm- no murmur appreciated. Lungs- clear to ascultation. Respirations even and nonlabored. Abdomen- Soft, bowel sounds positive, non tender  Musculo/Extremities-  Equal and symmetrical, no edema. No tenderness. Skin:  Warm and dry, free from rashes. Cultures reviewed    Radiology reviewed  MRI HAND LEFT WO CONTRAST   Final Result   Small fluid collection overlying the 4th metacarpal concerning for an abscess. Diffuse edema and skin thickening overlying the dorsum of the wrist and hand   suggestive of cellulitis. Small ganglion cyst adjacent to the posterior aspect of the ulna.              Assessment    Left dorsal hand abscess/celllulitis    Ortho consulted     WBC 7300  Gm stain gm pos cocci in clusters  MRI see report   sent to ortho    Afebrile   Plan  iv vanco   stop cefepime  Staph aureus   do not have susceptiblities  Hx of mrsa'  Zyvox for 14 days  Followup with ID'  Ortho saw MRI and are ok with discharge  Weekly cbc with diff and cmp  After a lengthy discussion with the pt  will keep him another day tosee if he can tolerate zyvox   if he can  will send him on zyvox tomorrow if MRSA   or if he cannot   he will get iv vancomycin if MRSA           Electronically signed by Kelin Grijalva MD on 12/13/2022 at 12:27 PM

## 2022-12-13 NOTE — PLAN OF CARE
Problem: Chronic Conditions and Co-morbidities  Goal: Patient's chronic conditions and co-morbidity symptoms are monitored and maintained or improved  Outcome: Progressing     Problem: Discharge Planning  Goal: Discharge to home or other facility with appropriate resources  Outcome: Progressing     Problem: Pain  Goal: Verbalizes/displays adequate comfort level or baseline comfort level  Outcome: Progressing     Problem: Skin/Tissue Integrity - Adult  Goal: Incisions, wounds, or drain sites healing without S/S of infection  Outcome: Progressing

## 2022-12-13 NOTE — PROGRESS NOTES
Per Dr Wendolyn Runner request, perfect served Dr Luiza Mercado and requested he review the MRI and make recommendations for further intervention if needed or discharge.

## 2022-12-13 NOTE — PROGRESS NOTES
[facility]   Progress note      Subjective:  Patient is awake and alert, very anxious on exam.  Sitting up in bed without complaints  Denies chest pain, sob    Objective:    BP (!) 139/92   Pulse 91   Temp 97.5 °F (36.4 °C) (Temporal)   Resp 16   Ht 5' 10\" (1.778 m)   Wt 165 lb (74.8 kg)   SpO2 98%   BMI 23.68 kg/m²     In: 6754.3 [P.O.:720; I.V.:4649.3]  Out: -   In: 6754.3   Out: -     General appearance: NAD, conversant  HEENT: AT/NC, MMM  Neck: FROM, supple  Lungs: Clear to auscultation  CV: RRR, no MRGs  Vasc: Radial pulses 2+  Abdomen: Soft, non-tender; no masses or HSM  Extremities: Hand has improved  Intact range of motion of wrist and elbow, slightly more tight on finger  Skin: no rash, lesions or ulcers  Psych: Alert and oriented to person, place and time  Neuro: Alert and interactive     Recent Labs     12/11/22  0609   WBC 7.3   HGB 13.6   HCT 40.2          Recent Labs     12/11/22  0609 12/12/22  0705     --    K 4.2  --    *  --    CO2 22  --    BUN 11  --    CREATININE 0.8 0.7   CALCIUM 9.3  --        Assessment:    Principal Problem:    Cellulitis  Active Problems:    Cellulitis of left hand  Resolved Problems:    * No resolved hospital problems.  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 Donna Cole    12/9/2022  Possible bedside incic/drainage of abscess today  Left hand wound is markedly improved  Patient still receiving IV Comycin  Okay for discharge from medicine standpoint if switch to p.o. antibiotics per ID, after I&D completed    12/10/2022  No acute events overnight  Status post I&D by orthopedics  Awaiting final cultures narrow antibiotics for discharge plan per ID    12/11/2022  Remains on IV vancomycin, cefepime discontinued  He feels that his left hand is worse today after fine-needle aspiration was performed  Hopeful for discharge later today or tomorrow once cultures return and antibiotic switched to p.o. Will discuss with ID  Ativan 0.5 p.o. for severe anxiety    12/12/2022  Continue with vancomycin  Mri hand to rule out osteomyelitis  Once atb's are finalized and mri is complete patient may be discharged later today    12/13/2022  MRI hand reveals abscess  Await orthopedic surgery input on MRI  Continue IV atb's per ID.   Patient extremely anxious    Code Status:  Full  Consultants: ID, Ortho     DVT Prophylaxis - Lovenox  PT/OT  Discharge planning     FAISAL Sanchez - CNP  11:00 AM  12/13/2022

## 2022-12-13 NOTE — DISCHARGE INSTRUCTIONS
Orthopedics Discharge Instructions   Weight bearing Status - weight bearing as tolerated- right upp extremity  Pain medication Per Prescription  ABX as prescribed  Ice to operative/injured site for 15-30 minutes of each hour for next 3-5 days    Elevate operative/injured limb on 2 pillows at home  Fracture Care -  If your splint/cast becomes too tight, too loose, wet or damaged please contact our office right away we will need to change out the splint/cast.      Followup in one week with ID  Weekly cbc with diff and cmp

## 2022-12-13 NOTE — PROGRESS NOTES
Pharmacy Consultation Note  (Antibiotic Dosing and Monitoring)    Initial consult date: 12/8/22  Consulting physician/provider: Dr. Franny Hamilton  Drug: Vancomycin  Indication: Cellulitis    Vancomycin has been discontinued. Clinical Pharmacy to sign-off. Physician to re-consult pharmacy if future dosing is needed.      Thank you for the consult,     Charlotte Corrales, PharmD, [id number]  PGY1 Pharmacy Resident     12/13/2022 3:04 PM

## 2022-12-14 VITALS
SYSTOLIC BLOOD PRESSURE: 131 MMHG | DIASTOLIC BLOOD PRESSURE: 87 MMHG | HEART RATE: 71 BPM | BODY MASS INDEX: 23.62 KG/M2 | OXYGEN SATURATION: 98 % | HEIGHT: 70 IN | RESPIRATION RATE: 12 BRPM | TEMPERATURE: 98.2 F | WEIGHT: 165 LBS

## 2022-12-14 LAB
BODY FLUID CULTURE, STERILE: ABNORMAL
GRAM STAIN RESULT: ABNORMAL
ORGANISM: ABNORMAL

## 2022-12-14 PROCEDURE — 6370000000 HC RX 637 (ALT 250 FOR IP): Performed by: INTERNAL MEDICINE

## 2022-12-14 RX ORDER — IBUPROFEN 200 MG
200 TABLET ORAL EVERY 6 HOURS PRN
Qty: 120 TABLET | Refills: 3 | Status: SHIPPED | OUTPATIENT
Start: 2022-12-14

## 2022-12-14 RX ORDER — ONDANSETRON 4 MG/1
4 TABLET, ORALLY DISINTEGRATING ORAL EVERY 8 HOURS PRN
Qty: 10 TABLET | Refills: 0 | Status: SHIPPED | OUTPATIENT
Start: 2022-12-14

## 2022-12-14 RX ADMIN — LINEZOLID 600 MG: 600 TABLET, FILM COATED ORAL at 09:16

## 2022-12-14 NOTE — PROGRESS NOTES
CLINICAL PHARMACY NOTE: MEDS TO BEDS    Total # of Prescriptions Filled: 3   The following medications were delivered to the patient:  Ibuprofen 200  Ondansetron 4mg odt  Linezolid 600    Additional Documentation:

## 2022-12-14 NOTE — PROGRESS NOTES
Infectious Disease  Progress Note  [facility]    Chief Complaint: left hand pain    Subjective: left hand abscess/cellulitis    Scheduled Meds:   linezolid  600 mg Oral 2 times per day    sodium chloride flush  10 mL IntraVENous 2 times per day    enoxaparin  40 mg SubCUTAneous Daily     Continuous Infusions:   sodium chloride Stopped (12/14/22 0906)    sodium chloride 75 mL/hr at 12/09/22 2147     PRN Meds:oxyCODONE-acetaminophen, ibuprofen, sodium chloride flush, sodium chloride, ondansetron **OR** ondansetron, magnesium hydroxide, acetaminophen **OR** acetaminophen    Patient Vitals for the past 24 hrs:   BP Temp Temp src Pulse Resp SpO2   12/14/22 0846 131/87 -- -- 71 -- 98 %   12/14/22 0845 (!) 144/83 98.2 °F (36.8 °C) Oral 77 12 98 %   12/14/22 0030 112/63 97.6 °F (36.4 °C) Temporal 59 16 96 %   12/13/22 2142 116/70 97.8 °F (36.6 °C) Temporal 67 16 91 %       CBC with Differential:    Lab Results   Component Value Date/Time    WBC 7.3 12/11/2022 06:09 AM    RBC 4.47 12/11/2022 06:09 AM    HGB 13.6 12/11/2022 06:09 AM    HCT 40.2 12/11/2022 06:09 AM     12/11/2022 06:09 AM    MCV 89.9 12/11/2022 06:09 AM    MCH 30.4 12/11/2022 06:09 AM    MCHC 33.8 12/11/2022 06:09 AM    RDW 13.0 12/11/2022 06:09 AM    BANDSPCT 3 10/01/2015 04:44 PM    LYMPHOPCT 22.7 12/11/2022 06:09 AM    MONOPCT 8.3 12/11/2022 06:09 AM    BASOPCT 0.7 12/11/2022 06:09 AM    MONOSABS 0.60 12/11/2022 06:09 AM    LYMPHSABS 1.65 12/11/2022 06:09 AM    EOSABS 0.23 12/11/2022 06:09 AM    BASOSABS 0.05 12/11/2022 06:09 AM     CMP:    Lab Results   Component Value Date/Time     12/11/2022 06:09 AM    K 4.2 12/11/2022 06:09 AM     12/11/2022 06:09 AM    CO2 22 12/11/2022 06:09 AM    BUN 11 12/11/2022 06:09 AM    CREATININE 0.7 12/12/2022 07:05 AM    GFRAA >60 10/04/2022 03:17 PM    LABGLOM >60 12/12/2022 07:05 AM    GLUCOSE 93 12/11/2022 06:09 AM    PROT 8.2 12/08/2022 10:53 AM    LABALBU 4.7 12/08/2022 10:53 AM    CALCIUM 9.3 12/11/2022 06:09 AM    BILITOT 1.3 12/08/2022 10:53 AM    ALKPHOS 115 12/08/2022 10:53 AM    AST 20 12/08/2022 10:53 AM    ALT 27 12/08/2022 10:53 AM       /87   Pulse 71   Temp 98.2 °F (36.8 °C) (Oral)   Resp 12   Ht 5' 10\" (1.778 m)   Wt 165 lb (74.8 kg)   SpO2 98%   BMI 23.68 kg/m²     Physical Exam  Const/Neuro- unchanged, no signs of acute distress, Alert  ENMT- Within Normal Limits, Normocephalic, mucous membranes pink/moist, No thrush  Neck: Neck supple  Heart- Regular, Rate, Rhythm- no murmur appreciated. Lungs- clear to ascultation. Respirations even and nonlabored. Abdomen- Soft, bowel sounds positive, non tender  Musculo/Extremities-  Equal and symmetrical, no edema. No tenderness. Skin:  Warm and dry, free from rashes. Cultures reviewed    Radiology reviewed  MRI HAND LEFT WO CONTRAST   Final Result   Small fluid collection overlying the 4th metacarpal concerning for an abscess. Diffuse edema and skin thickening overlying the dorsum of the wrist and hand   suggestive of cellulitis. Small ganglion cyst adjacent to the posterior aspect of the ulna.              Assessment    Left dorsal hand abscess/celllulitis    Ortho consulted     WBC 7300  Gm stain gm pos cocci in clusters  MRI see report   sent to ortho    Afebrile   Plan  iv vanco   stop cefepime  Staph aureus   do not have susceptiblities  Hx of mrsa'  Zyvox for 14 days  Followup with ID'  Ortho saw MRI and are ok with discharge  Weekly cbc with diff and cmp  After a lengthy discussion with the pt  will keep him another day tosee if he can tolerate zyvox   if he can  will send him on zyvox tomorrow if MRSA   or if he cannot   he will get iv vancomycin if MRSA  Home today on zyvox for fourteen days    foloowup withID in one week           Electronically signed by Jp Brizuela MD on 12/14/2022 at 10:52 AM

## 2022-12-14 NOTE — CARE COORDINATION
12/14 Care Coordination:Pt for discharge, Pt did joe po Zyvox. CM gave pt letter stating days in hospital.  CM/SW will continue to follow for discharge planning.    Jhoana VALLEN,RN--BC  [phone]

## 2022-12-15 NOTE — PROGRESS NOTES
Physician Progress Note      PATIENT:               Lacey Ledbetter  CSN #:                  [id number]  :                       1965  ADMIT DATE:       2022 10:46 AM  DISCH DATE:        2022 1:02 PM  RESPONDING  PROVIDER #:        Kenyetta Layne MD          QUERY TEXT:    Dear Provider,    Pt admitted with left hand cellulitis/ abscess. On admission pt noted to have   WBC 13.2, CRP 3.1. If possible, please document in the progress notes and   discharge summary if you are evaluating and /or treating any of the following: The medical record reflects the following:  Risk Factors: Left hand cellulitis/abscess, Hx MRSA infection  Clinical Indicators: On admission :WBC 13.2, CRP 3.1; T 97.3 to 63   RR 16-20      150/83    97%  ID consult: Left dorsal hand abscess/celllulitis, Ortho consulted, WBC   13,200,Afebrile . Plan : Start iv vanco, continue cefepime  for now   Ortho note: Left hand cellulitis. Wound may be maturing to subcutaneous   abscess. Patient may benefit from bedside  incision and drainage. We will continue to monitor patient clinically and   reevaluate later today for possible drainage. Treatment: Labs, Imaging, Vancomycin IV, Cefepime, Unasyn, ID consult, ortho   consult    Thank you,  Mariama Green RN  Clinical Documentation Improvement  [phone]  Options provided:  -- Sepsis, present on admission  -- Left hand cellulitis/abscess without Sepsis  -- Other - I will add my own diagnosis  -- Disagree - Not applicable / Not valid  -- Disagree - Clinically unable to determine / Unknown  -- Refer to Clinical Documentation Reviewer    PROVIDER RESPONSE TEXT:    This patient has left hand cellulitis/ abscess without Sepsis.     Query created by: Elin Bill on 2022 11:20 AM      Electronically signed by:  Keneytta Layne MD 2022 5:53 PM

## 2022-12-16 ENCOUNTER — OFFICE VISIT (OUTPATIENT)
Dept: ORTHOPEDIC SURGERY | Age: 57
End: 2022-12-16
Payer: COMMERCIAL

## 2022-12-16 VITALS
TEMPERATURE: 97.6 F | HEIGHT: 70 IN | SYSTOLIC BLOOD PRESSURE: 135 MMHG | HEART RATE: 65 BPM | WEIGHT: 165 LBS | OXYGEN SATURATION: 98 % | RESPIRATION RATE: 17 BRPM | BODY MASS INDEX: 23.62 KG/M2 | DIASTOLIC BLOOD PRESSURE: 79 MMHG

## 2022-12-16 DIAGNOSIS — L03.114 CELLULITIS OF LEFT HAND: Primary | ICD-10-CM

## 2022-12-16 PROCEDURE — 99202 OFFICE O/P NEW SF 15 MIN: CPT | Performed by: ORTHOPAEDIC SURGERY

## 2022-12-16 PROCEDURE — 99024 POSTOP FOLLOW-UP VISIT: CPT | Performed by: ORTHOPAEDIC SURGERY

## 2022-12-16 NOTE — LETTER
[address]  [address]  [address] [facility] [address] [address]  Phone: [phone]  Fax: [phone]          December 16, 2022     Patient: Salvador Bonds   YOB: 1965   Date of Visit: 12/16/2022       To Whom It May Concern: It is my medical opinion that Rozina Villafana should remain out of work until 12/26/22. If you have any questions or concerns, please don't hesitate to call.     Sincerely,        [facility]
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
## 2022-12-21 ENCOUNTER — OFFICE VISIT (OUTPATIENT)
Dept: ORTHOPEDIC SURGERY | Age: 57
End: 2022-12-21
Payer: COMMERCIAL

## 2022-12-21 DIAGNOSIS — L03.114 CELLULITIS OF LEFT HAND: Primary | ICD-10-CM

## 2022-12-21 PROCEDURE — G8420 CALC BMI NORM PARAMETERS: HCPCS | Performed by: PHYSICIAN ASSISTANT

## 2022-12-21 PROCEDURE — 1111F DSCHRG MED/CURRENT MED MERGE: CPT | Performed by: PHYSICIAN ASSISTANT

## 2022-12-21 PROCEDURE — G8484 FLU IMMUNIZE NO ADMIN: HCPCS | Performed by: PHYSICIAN ASSISTANT

## 2022-12-21 PROCEDURE — G8428 CUR MEDS NOT DOCUMENT: HCPCS | Performed by: PHYSICIAN ASSISTANT

## 2022-12-21 PROCEDURE — 99213 OFFICE O/P EST LOW 20 MIN: CPT | Performed by: PHYSICIAN ASSISTANT

## 2022-12-21 PROCEDURE — 4004F PT TOBACCO SCREEN RCVD TLK: CPT | Performed by: PHYSICIAN ASSISTANT

## 2022-12-21 PROCEDURE — 99212 OFFICE O/P EST SF 10 MIN: CPT

## 2022-12-21 PROCEDURE — 3017F COLORECTAL CA SCREEN DOC REV: CPT | Performed by: PHYSICIAN ASSISTANT

## 2022-12-21 NOTE — LETTER
[address]  [address]  [address] [facility] [address]  Phone: [phone]  Fax: [address] [place], [place]        December 21, 2022     Patient: Edda Lobo   YOB: 1965   Date of Visit: 12/21/2022       To Whom It May Concern: It is my medical opinion that Basilia Martinez may return to work on 1-2-23 with restrictions consisting of limited use of the left hand for 2 weeks then can progress to full duty. .    If you have any questions or concerns, please don't hesitate to call.     Sincerely,        MARQUIS Treadwell

## 2022-12-21 NOTE — PROGRESS NOTES
Chief Complaint   Patient presents with    Cellulitis     Left hand cellulitis. 2 week f/u, remains c/o pain rated 4-5 out of 10. C/o of left arm swelling. Felt pulsating in left upper arm last evening. SUBJECTIVE: Susan Díaz is a 78-year-old male who presents for follow-up for left hand cellulitis. He noticed swelling to the dorsum of his left hand that started about 2 weeks ago. He is not sure what happened but thinks that it may have been an insect bite. He was admitted to the hospital for left hand abscess and cellulitis. Patient had a needle aspiration performed in the hospital.  Gram stain grew few gram-positive cocci in clusters, cultures grew staph. Infectious disease is following him, he was given IV antibiotics in the hospital and discharged on oral antibiotics. Currently he is on Zyvox. He states the swelling has gone down in his left hand and is very mild. Denies fever, chills or night sweats. No other orthopedic complaints at this time. Review of Systems -   General ROS: negative for - chills, fatigue, fever or night sweats  Respiratory ROS: no cough, shortness of breath, or wheezing  Cardiovascular ROS: no chest pain or dyspnea on exertion  Gastrointestinal ROS: no abdominal pain, change in bowel habits, or black or bloody stools  Genitourinary: no hematuria, dysuria, or incontinence   Musculoskeletal ROS:see above  Neurological ROS: no TIA or stroke symptoms     OBJECTIVE:   Alert and oriented X 3, no acute distress, respirations easy and unlabored with no audible wheezes, skin warm and dry, speech and dress appropriate for noted age, affect euthymic.     Extremity:  Left Upper Extremity  Skin is clean dry and intact  No active drainage, erythema, purulence, tenderness or malodor  Very mild edema noted to the dorsum of his hand fourth metacarpal area  Radial pulse palpable, fingers warm with BCR  Flex/extension intact to wrist, thumb and fingers  Finger opposition intact  Finger adduction/abduction intact  Finger crossover intact  Subjectively states sensation intact to radial/medial/ulnar distribution    XR: 12/21/22   Not taken today. There were no vitals taken for this visit. ASSESSMENT:   Diagnosis Orders   1. Cellulitis of left hand          PLAN:  Activities as tolerated with the left hand. Antibiotics per ID. He has a follow-up with ID next week. Patient given a form to return to work with restrictions on 1-2-2023    Follow-up in 2 weeks for reevaluation. Call if any questions, concerns or red flags for infection such as drainage, redness, swelling or tenderness. Electronically signed by MARQUIS Deutsch on 12/21/2022 at 8:41 AM  Note: This report was completed using computerStayClassy voiced recognition software. Every effort has been made to ensure accuracy; however, inadvertent computerized transcription errors may be present.

## 2022-12-21 NOTE — PATIENT INSTRUCTIONS
Activities as tolerated with the left hand. Antibiotics per ID. He has a follow-up with ID next week. Patient given a form to return to work with restrictions on 1-2-2023    Follow-up in 2 weeks for reevaluation. Call if any questions, concerns or red flags for infection such as drainage, redness, swelling or tenderness.

## 2022-12-26 ENCOUNTER — HOSPITAL ENCOUNTER (OUTPATIENT)
Age: 57
Discharge: HOME | End: 2022-12-26
Payer: COMMERCIAL

## 2022-12-26 DIAGNOSIS — L03.114 CELLULITIS OF LEFT HAND: ICD-10-CM

## 2022-12-26 LAB
ALBUMIN SERPL-MCNC: 4.3 G/DL (ref 3.5–5.2)
ALP BLD-CCNC: 82 U/L (ref 40–129)
ALT SERPL-CCNC: 23 U/L (ref 0–40)
ANION GAP SERPL CALCULATED.3IONS-SCNC: 6 MMOL/L (ref 7–16)
AST SERPL-CCNC: 18 U/L (ref 0–39)
BASOPHILS ABSOLUTE: 0.04 E9/L (ref 0–0.2)
BASOPHILS RELATIVE PERCENT: 0.7 % (ref 0–2)
BILIRUB SERPL-MCNC: 0.9 MG/DL (ref 0–1.2)
BUN BLDV-MCNC: 13 MG/DL (ref 6–20)
C-REACTIVE PROTEIN: <0.3 MG/DL (ref 0–0.4)
CALCIUM SERPL-MCNC: 9.2 MG/DL (ref 8.6–10.2)
CHLORIDE BLD-SCNC: 105 MMOL/L (ref 98–107)
CO2: 29 MMOL/L (ref 22–29)
CREAT SERPL-MCNC: 0.9 MG/DL (ref 0.7–1.2)
EOSINOPHILS ABSOLUTE: 0.15 E9/L (ref 0.05–0.5)
EOSINOPHILS RELATIVE PERCENT: 2.5 % (ref 0–6)
GFR SERPL CREATININE-BSD FRML MDRD: >60 ML/MIN/1.73
GLUCOSE BLD-MCNC: 103 MG/DL (ref 74–99)
HCT VFR BLD CALC: 39.2 % (ref 37–54)
HEMOGLOBIN: 13.5 G/DL (ref 12.5–16.5)
IMMATURE GRANULOCYTES #: 0.02 E9/L
IMMATURE GRANULOCYTES %: 0.3 % (ref 0–5)
LYMPHOCYTES ABSOLUTE: 1.62 E9/L (ref 1.5–4)
LYMPHOCYTES RELATIVE PERCENT: 26.7 % (ref 20–42)
MCH RBC QN AUTO: 30.7 PG (ref 26–35)
MCHC RBC AUTO-ENTMCNC: 34.4 % (ref 32–34.5)
MCV RBC AUTO: 89.1 FL (ref 80–99.9)
MONOCYTES ABSOLUTE: 0.36 E9/L (ref 0.1–0.95)
MONOCYTES RELATIVE PERCENT: 5.9 % (ref 2–12)
NEUTROPHILS ABSOLUTE: 3.88 E9/L (ref 1.8–7.3)
NEUTROPHILS RELATIVE PERCENT: 63.9 % (ref 43–80)
PDW BLD-RTO: 13.2 FL (ref 11.5–15)
PLATELET # BLD: 193 E9/L (ref 130–450)
PMV BLD AUTO: 9.2 FL (ref 7–12)
POTASSIUM SERPL-SCNC: 4 MMOL/L (ref 3.5–5)
RBC # BLD: 4.4 E12/L (ref 3.8–5.8)
SEDIMENTATION RATE, ERYTHROCYTE: 3 MM/HR (ref 0–15)
SODIUM BLD-SCNC: 140 MMOL/L (ref 132–146)
TOTAL PROTEIN: 7 G/DL (ref 6.4–8.3)
WBC # BLD: 6.1 E9/L (ref 4.5–11.5)

## 2022-12-26 PROCEDURE — 86140 C-REACTIVE PROTEIN: CPT

## 2022-12-26 PROCEDURE — 85025 COMPLETE CBC W/AUTO DIFF WBC: CPT

## 2022-12-26 PROCEDURE — 85651 RBC SED RATE NONAUTOMATED: CPT

## 2022-12-26 PROCEDURE — 80053 COMPREHEN METABOLIC PANEL: CPT

## 2022-12-26 PROCEDURE — 36415 COLL VENOUS BLD VENIPUNCTURE: CPT

## 2023-01-12 ENCOUNTER — HOSPITAL ENCOUNTER (OUTPATIENT)
Age: 58
Discharge: HOME | End: 2023-01-12
Payer: COMMERCIAL

## 2023-01-12 DIAGNOSIS — L03.114 CELLULITIS OF LEFT HAND: ICD-10-CM

## 2023-01-12 LAB
ALBUMIN SERPL-MCNC: 4.5 G/DL (ref 3.5–5.2)
ALP BLD-CCNC: 88 U/L (ref 40–129)
ALT SERPL-CCNC: 39 U/L (ref 0–40)
ANION GAP SERPL CALCULATED.3IONS-SCNC: 12 MMOL/L (ref 7–16)
AST SERPL-CCNC: 27 U/L (ref 0–39)
BASOPHILS ABSOLUTE: 0.04 E9/L (ref 0–0.2)
BASOPHILS RELATIVE PERCENT: 0.6 % (ref 0–2)
BILIRUB SERPL-MCNC: 1.8 MG/DL (ref 0–1.2)
BUN BLDV-MCNC: 13 MG/DL (ref 6–20)
CALCIUM SERPL-MCNC: 9.8 MG/DL (ref 8.6–10.2)
CHLORIDE BLD-SCNC: 104 MMOL/L (ref 98–107)
CO2: 26 MMOL/L (ref 22–29)
CREAT SERPL-MCNC: 0.9 MG/DL (ref 0.7–1.2)
EOSINOPHILS ABSOLUTE: 0.15 E9/L (ref 0.05–0.5)
EOSINOPHILS RELATIVE PERCENT: 2.2 % (ref 0–6)
GFR SERPL CREATININE-BSD FRML MDRD: >60 ML/MIN/1.73
GLUCOSE BLD-MCNC: 72 MG/DL (ref 74–99)
HCT VFR BLD CALC: 41.9 % (ref 37–54)
HEMOGLOBIN: 14.7 G/DL (ref 12.5–16.5)
IMMATURE GRANULOCYTES #: 0.02 E9/L
IMMATURE GRANULOCYTES %: 0.3 % (ref 0–5)
LYMPHOCYTES ABSOLUTE: 1.66 E9/L (ref 1.5–4)
LYMPHOCYTES RELATIVE PERCENT: 24.9 % (ref 20–42)
MCH RBC QN AUTO: 31.3 PG (ref 26–35)
MCHC RBC AUTO-ENTMCNC: 35.1 % (ref 32–34.5)
MCV RBC AUTO: 89.3 FL (ref 80–99.9)
MONOCYTES ABSOLUTE: 0.47 E9/L (ref 0.1–0.95)
MONOCYTES RELATIVE PERCENT: 7 % (ref 2–12)
NEUTROPHILS ABSOLUTE: 4.33 E9/L (ref 1.8–7.3)
NEUTROPHILS RELATIVE PERCENT: 65 % (ref 43–80)
PDW BLD-RTO: 13.9 FL (ref 11.5–15)
PLATELET # BLD: 284 E9/L (ref 130–450)
PMV BLD AUTO: 9.8 FL (ref 7–12)
POTASSIUM SERPL-SCNC: 4.6 MMOL/L (ref 3.5–5)
RBC # BLD: 4.69 E12/L (ref 3.8–5.8)
SODIUM BLD-SCNC: 142 MMOL/L (ref 132–146)
TOTAL PROTEIN: 7.6 G/DL (ref 6.4–8.3)
WBC # BLD: 6.7 E9/L (ref 4.5–11.5)

## 2023-01-12 PROCEDURE — 36415 COLL VENOUS BLD VENIPUNCTURE: CPT

## 2023-01-12 PROCEDURE — 80053 COMPREHEN METABOLIC PANEL: CPT

## 2023-01-12 PROCEDURE — 85025 COMPLETE CBC W/AUTO DIFF WBC: CPT

## 2023-01-23 ENCOUNTER — HOSPITAL ENCOUNTER (OUTPATIENT)
Age: 58
Discharge: HOME | End: 2023-01-23
Payer: COMMERCIAL

## 2023-01-23 DIAGNOSIS — L03.114 CELLULITIS OF LEFT HAND: ICD-10-CM

## 2023-01-23 LAB
ALBUMIN SERPL-MCNC: 4.5 G/DL (ref 3.5–5.2)
ALP BLD-CCNC: 82 U/L (ref 40–129)
ALT SERPL-CCNC: 39 U/L (ref 0–40)
ANION GAP SERPL CALCULATED.3IONS-SCNC: 12 MMOL/L (ref 7–16)
AST SERPL-CCNC: 28 U/L (ref 0–39)
BASOPHILS ABSOLUTE: 0.05 E9/L (ref 0–0.2)
BASOPHILS RELATIVE PERCENT: 0.9 % (ref 0–2)
BILIRUB SERPL-MCNC: 1.1 MG/DL (ref 0–1.2)
BUN BLDV-MCNC: 12 MG/DL (ref 6–20)
CALCIUM SERPL-MCNC: 9.3 MG/DL (ref 8.6–10.2)
CHLORIDE BLD-SCNC: 106 MMOL/L (ref 98–107)
CO2: 23 MMOL/L (ref 22–29)
CREAT SERPL-MCNC: 0.8 MG/DL (ref 0.7–1.2)
EOSINOPHILS ABSOLUTE: 0.15 E9/L (ref 0.05–0.5)
EOSINOPHILS RELATIVE PERCENT: 2.6 % (ref 0–6)
GFR SERPL CREATININE-BSD FRML MDRD: >60 ML/MIN/1.73
GLUCOSE BLD-MCNC: 99 MG/DL (ref 74–99)
HCT VFR BLD CALC: 40.3 % (ref 37–54)
HEMOGLOBIN: 13.9 G/DL (ref 12.5–16.5)
IMMATURE GRANULOCYTES #: 0.01 E9/L
IMMATURE GRANULOCYTES %: 0.2 % (ref 0–5)
LYMPHOCYTES ABSOLUTE: 2.4 E9/L (ref 1.5–4)
LYMPHOCYTES RELATIVE PERCENT: 42.1 % (ref 20–42)
MCH RBC QN AUTO: 30.5 PG (ref 26–35)
MCHC RBC AUTO-ENTMCNC: 34.5 % (ref 32–34.5)
MCV RBC AUTO: 88.4 FL (ref 80–99.9)
MONOCYTES ABSOLUTE: 0.43 E9/L (ref 0.1–0.95)
MONOCYTES RELATIVE PERCENT: 7.5 % (ref 2–12)
NEUTROPHILS ABSOLUTE: 2.66 E9/L (ref 1.8–7.3)
NEUTROPHILS RELATIVE PERCENT: 46.7 % (ref 43–80)
PDW BLD-RTO: 13.9 FL (ref 11.5–15)
PLATELET # BLD: 249 E9/L (ref 130–450)
PMV BLD AUTO: 10 FL (ref 7–12)
POTASSIUM SERPL-SCNC: 4.7 MMOL/L (ref 3.5–5)
RBC # BLD: 4.56 E12/L (ref 3.8–5.8)
SODIUM BLD-SCNC: 141 MMOL/L (ref 132–146)
TOTAL PROTEIN: 7.2 G/DL (ref 6.4–8.3)
WBC # BLD: 5.7 E9/L (ref 4.5–11.5)

## 2023-01-23 PROCEDURE — 36415 COLL VENOUS BLD VENIPUNCTURE: CPT

## 2023-01-23 PROCEDURE — 80053 COMPREHEN METABOLIC PANEL: CPT

## 2023-01-23 PROCEDURE — 85025 COMPLETE CBC W/AUTO DIFF WBC: CPT

## 2023-02-27 ENCOUNTER — HOSPITAL ENCOUNTER (OUTPATIENT)
Dept: GENERAL RADIOLOGY | Age: 58
Discharge: HOME | End: 2023-03-01
Payer: COMMERCIAL

## 2023-02-27 ENCOUNTER — HOSPITAL ENCOUNTER (OUTPATIENT)
Age: 58
Discharge: HOME | End: 2023-03-01
Payer: COMMERCIAL

## 2023-02-27 DIAGNOSIS — R52 PAIN: ICD-10-CM

## 2023-02-27 PROCEDURE — 73630 X-RAY EXAM OF FOOT: CPT

## 2023-02-27 PROCEDURE — 73610 X-RAY EXAM OF ANKLE: CPT

## 2025-08-22 ENCOUNTER — HOSPITAL ENCOUNTER (EMERGENCY)
Age: 60
Discharge: HOME | End: 2025-08-22
Attending: EMERGENCY MEDICINE
Payer: COMMERCIAL

## 2025-08-22 VITALS
HEART RATE: 52 BPM | OXYGEN SATURATION: 96 % | HEIGHT: 70 IN | BODY MASS INDEX: 23.62 KG/M2 | DIASTOLIC BLOOD PRESSURE: 58 MMHG | RESPIRATION RATE: 16 BRPM | TEMPERATURE: 98.2 F | SYSTOLIC BLOOD PRESSURE: 134 MMHG | WEIGHT: 165 LBS

## 2025-08-22 DIAGNOSIS — S10.96XD INSECT BITE, NONVENOMOUS OF FACE, NECK, AND SCALP EXCEPT EYE, SUBSEQUENT ENCOUNTER: Primary | ICD-10-CM

## 2025-08-22 DIAGNOSIS — S00.86XD INSECT BITE, NONVENOMOUS OF FACE, NECK, AND SCALP EXCEPT EYE, SUBSEQUENT ENCOUNTER: Primary | ICD-10-CM
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
## 2025-08-22 DIAGNOSIS — T78.40XD ALLERGIC REACTION, SUBSEQUENT ENCOUNTER: ICD-10-CM

## 2025-08-22 DIAGNOSIS — S00.06XD INSECT BITE, NONVENOMOUS OF FACE, NECK, AND SCALP EXCEPT EYE, SUBSEQUENT ENCOUNTER: Primary | ICD-10-CM

## 2025-08-22 PROBLEM — S00.06XA FACE, NECK, AND SCALP, INSECT BITE, NONVENOMOUS: Status: ACTIVE | Noted: 2025-08-22

## 2025-08-22 PROBLEM — S00.86XA FACE, NECK, AND SCALP, INSECT BITE, NONVENOMOUS: Status: ACTIVE | Noted: 2025-08-22

## 2025-08-22 PROBLEM — T78.40XA ALLERGIC REACTION: Status: ACTIVE | Noted: 2025-08-22

## 2025-08-22 PROBLEM — S10.96XA FACE, NECK, AND SCALP, INSECT BITE, NONVENOMOUS: Status: ACTIVE | Noted: 2025-08-22
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
## 2025-08-22 PROCEDURE — 6370000000 HC RX 637 (ALT 250 FOR IP): Performed by: EMERGENCY MEDICINE

## 2025-08-22 PROCEDURE — 99284 EMERGENCY DEPT VISIT MOD MDM: CPT

## 2025-08-22 PROCEDURE — 6360000002 HC RX W HCPCS: Performed by: EMERGENCY MEDICINE

## 2025-08-22 RX ORDER — FAMOTIDINE 20 MG/1
20 TABLET, FILM COATED ORAL ONCE
Status: COMPLETED | OUTPATIENT
Start: 2025-08-22 | End: 2025-08-22

## 2025-08-22 RX ORDER — CETIRIZINE HYDROCHLORIDE 10 MG/1
10 TABLET ORAL ONCE
Status: COMPLETED | OUTPATIENT
Start: 2025-08-22 | End: 2025-08-22

## 2025-08-22 RX ORDER — TRIAMCINOLONE ACETONIDE 40 MG/ML
60 INJECTION, SUSPENSION INTRA-ARTICULAR; INTRAMUSCULAR ONCE
Status: COMPLETED | OUTPATIENT
Start: 2025-08-22 | End: 2025-08-22

## 2025-08-22 RX ORDER — CETIRIZINE HYDROCHLORIDE 10 MG/1
10 TABLET ORAL DAILY
Status: DISCONTINUED | OUTPATIENT
Start: 2025-08-22 | End: 2025-08-22

## 2025-08-22 RX ORDER — FAMOTIDINE 20 MG/1
20 TABLET, FILM COATED ORAL 2 TIMES DAILY
Qty: 60 TABLET | Refills: 3 | Status: SHIPPED | OUTPATIENT
Start: 2025-08-22

## 2025-08-22 RX ORDER — CETIRIZINE HYDROCHLORIDE 10 MG/1
10 TABLET ORAL DAILY
Qty: 20 TABLET | Refills: 0 | Status: SHIPPED | OUTPATIENT
Start: 2025-08-22 | End: 2025-09-11

## 2025-08-22 RX ADMIN — FAMOTIDINE 20 MG: 20 TABLET, FILM COATED ORAL at 05:46

## 2025-08-22 RX ADMIN — TRIAMCINOLONE ACETONIDE 60 MG: 40 INJECTION, SUSPENSION INTRA-ARTICULAR; INTRAMUSCULAR at 05:46

## 2025-08-22 RX ADMIN — CETIRIZINE HYDROCHLORIDE 10 MG: 10 TABLET, FILM COATED ORAL at 05:46

## 2025-08-22 ASSESSMENT — PAIN SCALES - GENERAL: PAINLEVEL_OUTOF10: 0

## 2025-08-22 ASSESSMENT — LIFESTYLE VARIABLES
HOW OFTEN DO YOU HAVE A DRINK CONTAINING ALCOHOL: NEVER
HOW MANY STANDARD DRINKS CONTAINING ALCOHOL DO YOU HAVE ON A TYPICAL DAY: PATIENT DOES NOT DRINK

## 2025-08-22 ASSESSMENT — PAIN DESCRIPTION - LOCATION: LOCATION: HAND

## 2025-08-22 ASSESSMENT — PAIN - FUNCTIONAL ASSESSMENT: PAIN_FUNCTIONAL_ASSESSMENT: 0-10
